# Patient Record
Sex: FEMALE | Race: WHITE | NOT HISPANIC OR LATINO | Employment: FULL TIME | URBAN - METROPOLITAN AREA
[De-identification: names, ages, dates, MRNs, and addresses within clinical notes are randomized per-mention and may not be internally consistent; named-entity substitution may affect disease eponyms.]

---

## 2018-04-30 ENCOUNTER — HOSPITAL ENCOUNTER (EMERGENCY)
Facility: HOSPITAL | Age: 38
Discharge: HOME | End: 2018-05-01
Attending: EMERGENCY MEDICINE | Admitting: EMERGENCY MEDICINE

## 2018-04-30 VITALS
RESPIRATION RATE: 16 BRPM | TEMPERATURE: 98 F | HEART RATE: 83 BPM | WEIGHT: 215 LBS | SYSTOLIC BLOOD PRESSURE: 139 MMHG | BODY MASS INDEX: 36.7 KG/M2 | DIASTOLIC BLOOD PRESSURE: 90 MMHG | OXYGEN SATURATION: 100 % | HEIGHT: 64 IN

## 2018-04-30 DIAGNOSIS — H43.12 VITREOUS HEMORRHAGE OF LEFT EYE (HCC): ICD-10-CM

## 2018-04-30 DIAGNOSIS — H54.62 VISION LOSS OF LEFT EYE: Primary | ICD-10-CM

## 2018-04-30 PROCEDURE — 99282 EMERGENCY DEPT VISIT SF MDM: CPT | Performed by: EMERGENCY MEDICINE

## 2018-04-30 PROCEDURE — 99283 EMERGENCY DEPT VISIT LOW MDM: CPT

## 2018-04-30 RX ORDER — OMEPRAZOLE 20 MG/1
20 CAPSULE, DELAYED RELEASE ORAL DAILY
COMMUNITY

## 2018-04-30 RX ORDER — VILAZODONE HYDROCHLORIDE 20 MG/1
20 TABLET ORAL DAILY
COMMUNITY
End: 2020-03-09

## 2018-04-30 RX ORDER — PROPARACAINE HYDROCHLORIDE 5 MG/ML
2 SOLUTION/ DROPS OPHTHALMIC ONCE
Status: COMPLETED | OUTPATIENT
Start: 2018-04-30 | End: 2018-04-30

## 2018-04-30 RX ORDER — GLIPIZIDE 2.5 MG/1
2.5 TABLET, EXTENDED RELEASE ORAL DAILY
Status: ON HOLD | COMMUNITY
End: 2021-08-28

## 2018-04-30 RX ORDER — PRAVASTATIN SODIUM 40 MG
40 TABLET ORAL DAILY
COMMUNITY

## 2018-04-30 RX ADMIN — PROPARACAINE HYDROCHLORIDE 2 DROP: 5 SOLUTION/ DROPS OPHTHALMIC at 23:34

## 2018-05-01 NOTE — ED NOTES
Right pupil reactive to light JABIER 3,  Left 3 slightly reactive     Jacque Aguilar RN  04/30/18 [id number]

## 2018-05-01 NOTE — ED PROVIDER NOTES
Subjective   History of Present Illness  History of Present Illness    Chief complaint: Blurred vision    Location: Left eye    Quality/Severity: Patient nearly blind in the left eye    Timing/Onset/Duration: Acute onset at 5:30 PM    Modifying Factors: Nothing makes it better or worse    Associated Symptoms: No headache.  No trauma.    Narrative: 37-year-old white female developed loss of vision her left eye at approximately 5:30.  Patient is diabetic.  She has had a bleed in her eye before.  The patient denies any trauma.    PCP:  Tad      No orders to display     Labs Reviewed - No data to display  No results found.    Final diagnoses:   None         ED Medications:  Medications - No data to display    New Medications:     Medication List      ASK your doctor about these medications    cyclobenzaprine 10 MG tablet  Commonly known as:  FLEXERIL  Take 1 tablet by mouth 3 (three) times a day as needed for muscle spasms.     glipiZIDE 2.5 MG 24 hr tablet  Commonly known as:  GLUCOTROL XL     insulin aspart 100 UNIT/ML injection  Commonly known as:  novoLOG     meloxicam 7.5 MG tablet  Commonly known as:  MOBIC  Take 1 tablet by mouth daily.     metFORMIN 1000 MG tablet  Commonly known as:  GLUCOPHAGE     omeprazole 20 MG capsule  Commonly known as:  priLOSEC     pravastatin 40 MG tablet  Commonly known as:  PRAVACHOL     vilazodone 20 MG tablet tablet  Commonly known as:  VIIBRYD          Stopped Medications:     Medication List      ASK your doctor about these medications    cyclobenzaprine 10 MG tablet  Commonly known as:  FLEXERIL  Take 1 tablet by mouth 3 (three) times a day as needed for muscle spasms.     glipiZIDE 2.5 MG 24 hr tablet  Commonly known as:  GLUCOTROL XL     insulin aspart 100 UNIT/ML injection  Commonly known as:  novoLOG     meloxicam 7.5 MG tablet  Commonly known as:  MOBIC  Take 1 tablet by mouth daily.     metFORMIN 1000 MG tablet  Commonly known as:  GLUCOPHAGE     omeprazole 20 MG  capsule  Commonly known as:  priLOSEC     pravastatin 40 MG tablet  Commonly known as:  PRAVACHOL     vilazodone 20 MG tablet tablet  Commonly known as:  VIIBRYD            Review of Systems   Eyes: Positive for visual disturbance. Negative for photophobia, pain, discharge, redness and itching.   Neurological: Negative for headaches.       Past Medical History:   Diagnosis Date   • Depression    • Diabetes    • GERD (gastroesophageal reflux disease)    • Hyperlipidemia    • ABBIE (obstructive sleep apnea)        Allergies   Allergen Reactions   • Januvia [Sitagliptin]    • Clindamycin/Lincomycin Rash       Past Surgical History:   Procedure Laterality Date   •  SECTION     • REFRACTIVE SURGERY  2018       Family History   Problem Relation Age of Onset   • Diabetes Mother    • Cancer Mother    • Diabetes Father    • Heart disease Father    • Hypertension Father    • Diabetes Maternal Grandfather    • Diabetes Paternal Grandmother        Social History     Social History   • Marital status:      Social History Main Topics   • Smoking status: Never Smoker   • Smokeless tobacco: Never Used   • Alcohol use No   • Drug use: No   • Sexual activity: Defer     Other Topics Concern   • Not on file           Objective   Physical Exam   Constitutional: She is oriented to person, place, and time. She appears well-developed and well-nourished. No distress.   ED Triage Vitals (18 2257)  Temp: 98 °F (36.7 °C)  Heart Rate: 83  Resp: 16  BP: 139/90  SpO2: 100 %  Temp src: Oral  Heart Rate Source: Monitor  Patient Position: Sitting  BP Location: Right arm  FiO2 (%): n/a    The patient's vitals were reviewed by me.  Unless otherwise noted they are within normal limits.     HENT:   Head: Normocephalic and atraumatic.   Eyes: Conjunctivae and EOM are normal. Pupils are equal, round, and reactive to light. Left eye exhibits no discharge. No scleral icterus.   The retina cannot be easily visualized and appears to be dark  in color.   Neurological: She is alert and oriented to person, place, and time.   Nursing note and vitals reviewed.      Procedures         ED Course  ED Course      11:56 PM, 04/30/18:  I spoke with Dr. Rogers, on-call for ophthalmology at [facility].  He would like the patient seen in the clinic tomorrow.  The patient should call [phone] at 9 AM to be seen tomorrow without fail.    11:57 PM, 04/30/18:  The ocular pressure in the left eye is 36.  The ocular pressure in the right eye is 25.  There is no fluorescein strips available for staining the cornea, 0 national backorder.          [facility]    Final diagnoses:   None            Joe Mejias MD  04/30/18 7212

## 2018-06-27 ENCOUNTER — OFFICE VISIT (OUTPATIENT)
Dept: OBSTETRICS AND GYNECOLOGY | Facility: CLINIC | Age: 38
End: 2018-06-27

## 2018-06-27 VITALS
BODY MASS INDEX: 37.56 KG/M2 | WEIGHT: 220 LBS | SYSTOLIC BLOOD PRESSURE: 122 MMHG | DIASTOLIC BLOOD PRESSURE: 80 MMHG | HEIGHT: 64 IN

## 2018-06-27 DIAGNOSIS — Z30.432 ENCOUNTER FOR REMOVAL OF INTRAUTERINE CONTRACEPTIVE DEVICE (IUD): ICD-10-CM

## 2018-06-27 DIAGNOSIS — Z30.018 ENCOUNTER FOR INITIAL PRESCRIPTION OF OTHER CONTRACEPTIVES: ICD-10-CM

## 2018-06-27 DIAGNOSIS — Z30.432 ENCOUNTER FOR IUD REMOVAL: Primary | ICD-10-CM

## 2018-06-27 LAB
B-HCG UR QL: NEGATIVE
BILIRUB BLD-MCNC: NEGATIVE MG/DL
CLARITY, POC: CLEAR
COLOR UR: YELLOW
GLUCOSE UR STRIP-MCNC: NEGATIVE MG/DL
INTERNAL NEGATIVE CONTROL: NEGATIVE
INTERNAL POSITIVE CONTROL: POSITIVE
KETONES UR QL: NEGATIVE
LEUKOCYTE EST, POC: NEGATIVE
Lab: NORMAL
NITRITE UR-MCNC: NEGATIVE MG/ML
PH UR: 6 [PH] (ref 5–8)
PROT UR STRIP-MCNC: NEGATIVE MG/DL
RBC # UR STRIP: NEGATIVE /UL
SP GR UR: 1.02 (ref 1–1.03)
UROBILINOGEN UR QL: NORMAL

## 2018-06-27 PROCEDURE — 58301 REMOVE INTRAUTERINE DEVICE: CPT | Performed by: NURSE PRACTITIONER

## 2018-06-27 PROCEDURE — 81025 URINE PREGNANCY TEST: CPT | Performed by: NURSE PRACTITIONER

## 2018-06-27 PROCEDURE — 81002 URINALYSIS NONAUTO W/O SCOPE: CPT | Performed by: NURSE PRACTITIONER

## 2018-06-27 PROCEDURE — 99203 OFFICE O/P NEW LOW 30 MIN: CPT | Performed by: NURSE PRACTITIONER

## 2018-06-27 RX ORDER — MEDROXYPROGESTERONE ACETATE 150 MG/ML
150 INJECTION, SUSPENSION INTRAMUSCULAR
Qty: 1 ML | Refills: 3 | Status: SHIPPED | OUTPATIENT
Start: 2018-06-27 | End: 2019-12-12

## 2018-07-05 LAB
A VAGINAE DNA VAG QL NAA+PROBE: ABNORMAL SCORE
BVAB2 DNA VAG QL NAA+PROBE: ABNORMAL SCORE
C ALBICANS DNA VAG QL NAA+PROBE: POSITIVE
C GLABRATA DNA VAG QL NAA+PROBE: NEGATIVE
C TRACH RRNA SPEC QL NAA+PROBE: NEGATIVE
LABORATORY COMMENT REPORT: ABNORMAL
M GENITALIUM DNA SPEC QL NAA+PROBE: NEGATIVE
M HOMINIS DNA SPEC QL NAA+PROBE: NEGATIVE
MEGA1 DNA VAG QL NAA+PROBE: ABNORMAL SCORE
N GONORRHOEA RRNA SPEC QL NAA+PROBE: NEGATIVE
T VAGINALIS RRNA SPEC QL NAA+PROBE: NEGATIVE
UREAPLASMA DNA SPEC QL NAA+PROBE: POSITIVE

## 2018-07-10 RX ORDER — FLUCONAZOLE 150 MG/1
150 TABLET ORAL ONCE
Qty: 1 TABLET | Refills: 1 | Status: SHIPPED | OUTPATIENT
Start: 2018-07-10 | End: 2018-07-10

## 2018-07-10 RX ORDER — DOXYCYCLINE HYCLATE 100 MG
100 TABLET ORAL 2 TIMES DAILY
Qty: 14 TABLET | Refills: 0 | Status: SHIPPED | OUTPATIENT
Start: 2018-07-10 | End: 2018-07-17

## 2021-08-28 ENCOUNTER — APPOINTMENT (OUTPATIENT)
Dept: GENERAL RADIOLOGY | Facility: HOSPITAL | Age: 41
End: 2021-08-28

## 2021-08-28 ENCOUNTER — HOSPITAL ENCOUNTER (INPATIENT)
Facility: HOSPITAL | Age: 41
LOS: 6 days | Discharge: HOME | End: 2021-09-03
Attending: EMERGENCY MEDICINE | Admitting: INTERNAL MEDICINE

## 2021-08-28 DIAGNOSIS — E11.628 DIABETIC FOOT INFECTION (HCC): Primary | ICD-10-CM

## 2021-08-28 DIAGNOSIS — L08.9 DIABETIC FOOT INFECTION (HCC): Primary | ICD-10-CM

## 2021-08-28 DIAGNOSIS — L03.115 CELLULITIS OF RIGHT FOOT: ICD-10-CM

## 2021-08-28 DIAGNOSIS — E08.65 DIABETES MELLITUS DUE TO UNDERLYING CONDITION, UNCONTROLLED, WITH HYPERGLYCEMIA (HCC): ICD-10-CM

## 2021-08-28 LAB
ALBUMIN SERPL-MCNC: 3.2 G/DL (ref 3.5–5.2)
ALBUMIN/GLOB SERPL: 0.9 G/DL
ALP SERPL-CCNC: 122 U/L (ref 39–117)
ALT SERPL W P-5'-P-CCNC: 11 U/L (ref 1–33)
ANION GAP SERPL CALCULATED.3IONS-SCNC: 10.9 MMOL/L (ref 5–15)
ANION GAP SERPL CALCULATED.3IONS-SCNC: 12.2 MMOL/L (ref 5–15)
APTT PPP: 33.1 SECONDS (ref 24.3–38.1)
AST SERPL-CCNC: 13 U/L (ref 1–32)
BASOPHILS # BLD AUTO: 0.09 10*3/MM3 (ref 0–0.2)
BASOPHILS NFR BLD AUTO: 0.5 % (ref 0–1.5)
BILIRUB SERPL-MCNC: 0.3 MG/DL (ref 0–1.2)
BUN SERPL-MCNC: 17 MG/DL (ref 6–20)
BUN SERPL-MCNC: 18 MG/DL (ref 6–20)
BUN/CREAT SERPL: 18.7 (ref 7–25)
BUN/CREAT SERPL: 19.1 (ref 7–25)
CALCIUM SPEC-SCNC: 8.2 MG/DL (ref 8.6–10.5)
CALCIUM SPEC-SCNC: 9.2 MG/DL (ref 8.6–10.5)
CHLORIDE SERPL-SCNC: 100 MMOL/L (ref 98–107)
CHLORIDE SERPL-SCNC: 94 MMOL/L (ref 98–107)
CO2 SERPL-SCNC: 22.1 MMOL/L (ref 22–29)
CO2 SERPL-SCNC: 23.8 MMOL/L (ref 22–29)
CREAT SERPL-MCNC: 0.91 MG/DL (ref 0.57–1)
CREAT SERPL-MCNC: 0.94 MG/DL (ref 0.57–1)
D-LACTATE SERPL-SCNC: 1.1 MMOL/L (ref 0.5–2)
DEPRECATED RDW RBC AUTO: 42.5 FL (ref 37–54)
DEPRECATED RDW RBC AUTO: 43.8 FL (ref 37–54)
EOSINOPHIL # BLD AUTO: 0.2 10*3/MM3 (ref 0–0.4)
EOSINOPHIL NFR BLD AUTO: 1.2 % (ref 0.3–6.2)
ERYTHROCYTE [DISTWIDTH] IN BLOOD BY AUTOMATED COUNT: 13.5 % (ref 12.3–15.4)
ERYTHROCYTE [DISTWIDTH] IN BLOOD BY AUTOMATED COUNT: 13.5 % (ref 12.3–15.4)
ERYTHROCYTE [SEDIMENTATION RATE] IN BLOOD: 126 MM/HR (ref 0–20)
GFR SERPL CREATININE-BSD FRML MDRD: 66 ML/MIN/1.73
GFR SERPL CREATININE-BSD FRML MDRD: 68 ML/MIN/1.73
GLOBULIN UR ELPH-MCNC: 3.5 GM/DL
GLUCOSE BLDC GLUCOMTR-MCNC: 382 MG/DL (ref 70–130)
GLUCOSE SERPL-MCNC: 386 MG/DL (ref 65–99)
GLUCOSE SERPL-MCNC: 442 MG/DL (ref 65–99)
HBA1C MFR BLD: 12.1 % (ref 4.8–5.6)
HCT VFR BLD AUTO: 34.9 % (ref 34–46.6)
HCT VFR BLD AUTO: 36.1 % (ref 34–46.6)
HGB BLD-MCNC: 11.9 G/DL (ref 12–15.9)
HGB BLD-MCNC: 12 G/DL (ref 12–15.9)
IMM GRANULOCYTES # BLD AUTO: 0.08 10*3/MM3 (ref 0–0.05)
IMM GRANULOCYTES NFR BLD AUTO: 0.5 % (ref 0–0.5)
INR PPP: 0.96 (ref 0.9–1.1)
LYMPHOCYTES # BLD AUTO: 2.7 10*3/MM3 (ref 0.7–3.1)
LYMPHOCYTES NFR BLD AUTO: 15.7 % (ref 19.6–45.3)
MCH RBC QN AUTO: 29.3 PG (ref 26.6–33)
MCH RBC QN AUTO: 29.5 PG (ref 26.6–33)
MCHC RBC AUTO-ENTMCNC: 33.2 G/DL (ref 31.5–35.7)
MCHC RBC AUTO-ENTMCNC: 34.1 G/DL (ref 31.5–35.7)
MCV RBC AUTO: 86.6 FL (ref 79–97)
MCV RBC AUTO: 88.3 FL (ref 79–97)
MONOCYTES # BLD AUTO: 1.16 10*3/MM3 (ref 0.1–0.9)
MONOCYTES NFR BLD AUTO: 6.7 % (ref 5–12)
NEUTROPHILS NFR BLD AUTO: 13 10*3/MM3 (ref 1.7–7)
NEUTROPHILS NFR BLD AUTO: 75.4 % (ref 42.7–76)
NRBC BLD AUTO-RTO: 0 /100 WBC (ref 0–0.2)
PLATELET # BLD AUTO: 316 10*3/MM3 (ref 140–450)
PLATELET # BLD AUTO: 360 10*3/MM3 (ref 140–450)
PMV BLD AUTO: 10.6 FL (ref 6–12)
PMV BLD AUTO: 11.1 FL (ref 6–12)
POTASSIUM SERPL-SCNC: 4.4 MMOL/L (ref 3.5–5.2)
POTASSIUM SERPL-SCNC: 4.4 MMOL/L (ref 3.5–5.2)
PROCALCITONIN SERPL-MCNC: 0.46 NG/ML (ref 0–0.25)
PROT SERPL-MCNC: 6.7 G/DL (ref 6–8.5)
PROTHROMBIN TIME: 12.8 SECONDS (ref 12.1–15)
RBC # BLD AUTO: 4.03 10*6/MM3 (ref 3.77–5.28)
RBC # BLD AUTO: 4.09 10*6/MM3 (ref 3.77–5.28)
SARS-COV-2 RNA PNL SPEC NAA+PROBE: NOT DETECTED
SODIUM SERPL-SCNC: 130 MMOL/L (ref 136–145)
SODIUM SERPL-SCNC: 133 MMOL/L (ref 136–145)
WBC # BLD AUTO: 13.85 10*3/MM3 (ref 3.4–10.8)
WBC # BLD AUTO: 17.23 10*3/MM3 (ref 3.4–10.8)

## 2021-08-28 PROCEDURE — 82962 GLUCOSE BLOOD TEST: CPT

## 2021-08-28 PROCEDURE — 80053 COMPREHEN METABOLIC PANEL: CPT | Performed by: EMERGENCY MEDICINE

## 2021-08-28 PROCEDURE — G0378 HOSPITAL OBSERVATION PER HR: HCPCS

## 2021-08-28 PROCEDURE — 25010000002 ENOXAPARIN PER 10 MG: Performed by: STUDENT IN AN ORGANIZED HEALTH CARE EDUCATION/TRAINING PROGRAM

## 2021-08-28 PROCEDURE — 83036 HEMOGLOBIN GLYCOSYLATED A1C: CPT | Performed by: FAMILY MEDICINE

## 2021-08-28 PROCEDURE — 25010000002 ENOXAPARIN PER 10 MG: Performed by: FAMILY MEDICINE

## 2021-08-28 PROCEDURE — 63710000001 INSULIN REGULAR HUMAN PER 5 UNITS: Performed by: FAMILY MEDICINE

## 2021-08-28 PROCEDURE — 85610 PROTHROMBIN TIME: CPT | Performed by: EMERGENCY MEDICINE

## 2021-08-28 PROCEDURE — 85652 RBC SED RATE AUTOMATED: CPT | Performed by: FAMILY MEDICINE

## 2021-08-28 PROCEDURE — 73630 X-RAY EXAM OF FOOT: CPT

## 2021-08-28 PROCEDURE — 87186 SC STD MICRODIL/AGAR DIL: CPT | Performed by: EMERGENCY MEDICINE

## 2021-08-28 PROCEDURE — 87205 SMEAR GRAM STAIN: CPT | Performed by: EMERGENCY MEDICINE

## 2021-08-28 PROCEDURE — 99284 EMERGENCY DEPT VISIT MOD MDM: CPT

## 2021-08-28 PROCEDURE — 87635 SARS-COV-2 COVID-19 AMP PRB: CPT | Performed by: EMERGENCY MEDICINE

## 2021-08-28 PROCEDURE — 85025 COMPLETE CBC W/AUTO DIFF WBC: CPT | Performed by: EMERGENCY MEDICINE

## 2021-08-28 PROCEDURE — 99219 PR INITIAL OBSERVATION CARE/DAY 50 MINUTES: CPT | Performed by: FAMILY MEDICINE

## 2021-08-28 PROCEDURE — 87147 CULTURE TYPE IMMUNOLOGIC: CPT | Performed by: EMERGENCY MEDICINE

## 2021-08-28 PROCEDURE — 94799 UNLISTED PULMONARY SVC/PX: CPT

## 2021-08-28 PROCEDURE — 99285 EMERGENCY DEPT VISIT HI MDM: CPT | Performed by: EMERGENCY MEDICINE

## 2021-08-28 PROCEDURE — 85730 THROMBOPLASTIN TIME PARTIAL: CPT | Performed by: EMERGENCY MEDICINE

## 2021-08-28 PROCEDURE — 80048 BASIC METABOLIC PNL TOTAL CA: CPT | Performed by: FAMILY MEDICINE

## 2021-08-28 PROCEDURE — 83605 ASSAY OF LACTIC ACID: CPT | Performed by: EMERGENCY MEDICINE

## 2021-08-28 PROCEDURE — 63710000001 INSULIN DETEMIR PER 5 UNITS: Performed by: FAMILY MEDICINE

## 2021-08-28 PROCEDURE — 84145 PROCALCITONIN (PCT): CPT | Performed by: EMERGENCY MEDICINE

## 2021-08-28 PROCEDURE — 87070 CULTURE OTHR SPECIMN AEROBIC: CPT | Performed by: EMERGENCY MEDICINE

## 2021-08-28 PROCEDURE — 25010000002 VANCOMYCIN 1 G RECONSTITUTED SOLUTION 1 EACH VIAL: Performed by: EMERGENCY MEDICINE

## 2021-08-28 PROCEDURE — 25010000002 PIPERACILLIN SOD-TAZOBACTAM PER 1 G: Performed by: EMERGENCY MEDICINE

## 2021-08-28 PROCEDURE — 25010000002 VANCOMYCIN 1 G RECONSTITUTED SOLUTION

## 2021-08-28 PROCEDURE — 87040 BLOOD CULTURE FOR BACTERIA: CPT | Performed by: EMERGENCY MEDICINE

## 2021-08-28 PROCEDURE — 85027 COMPLETE CBC AUTOMATED: CPT | Performed by: FAMILY MEDICINE

## 2021-08-28 RX ORDER — SODIUM CHLORIDE 9 MG/ML
40 INJECTION, SOLUTION INTRAVENOUS AS NEEDED
Status: DISCONTINUED | OUTPATIENT
Start: 2021-08-28 | End: 2021-09-03 | Stop reason: HOSPADM

## 2021-08-28 RX ORDER — PANTOPRAZOLE SODIUM 40 MG/1
40 TABLET, DELAYED RELEASE ORAL EVERY MORNING
Status: DISCONTINUED | OUTPATIENT
Start: 2021-08-29 | End: 2021-09-03 | Stop reason: HOSPADM

## 2021-08-28 RX ORDER — SODIUM CHLORIDE 0.9 % (FLUSH) 0.9 %
10 SYRINGE (ML) INJECTION EVERY 12 HOURS SCHEDULED
Status: DISCONTINUED | OUTPATIENT
Start: 2021-08-28 | End: 2021-09-03 | Stop reason: HOSPADM

## 2021-08-28 RX ORDER — ONDANSETRON 4 MG/1
4 TABLET, FILM COATED ORAL EVERY 6 HOURS PRN
Status: DISCONTINUED | OUTPATIENT
Start: 2021-08-28 | End: 2021-09-03 | Stop reason: HOSPADM

## 2021-08-28 RX ORDER — DEXTROSE MONOHYDRATE 25 G/50ML
25 INJECTION, SOLUTION INTRAVENOUS
Status: DISCONTINUED | OUTPATIENT
Start: 2021-08-28 | End: 2021-09-03 | Stop reason: HOSPADM

## 2021-08-28 RX ORDER — ACETAMINOPHEN 650 MG/1
650 SUPPOSITORY RECTAL EVERY 4 HOURS PRN
Status: DISCONTINUED | OUTPATIENT
Start: 2021-08-28 | End: 2021-09-03 | Stop reason: HOSPADM

## 2021-08-28 RX ORDER — LISINOPRIL 10 MG/1
10 TABLET ORAL
Status: DISCONTINUED | OUTPATIENT
Start: 2021-08-29 | End: 2021-09-03 | Stop reason: HOSPADM

## 2021-08-28 RX ORDER — VANCOMYCIN HYDROCHLORIDE 1 G/20ML
INJECTION, POWDER, LYOPHILIZED, FOR SOLUTION INTRAVENOUS
Status: COMPLETED
Start: 2021-08-28 | End: 2021-08-28

## 2021-08-28 RX ORDER — ACETAMINOPHEN 160 MG/5ML
650 SOLUTION ORAL EVERY 4 HOURS PRN
Status: DISCONTINUED | OUTPATIENT
Start: 2021-08-28 | End: 2021-09-03 | Stop reason: HOSPADM

## 2021-08-28 RX ORDER — NICOTINE POLACRILEX 4 MG
15 LOZENGE BUCCAL
Status: DISCONTINUED | OUTPATIENT
Start: 2021-08-28 | End: 2021-09-03 | Stop reason: HOSPADM

## 2021-08-28 RX ORDER — SODIUM CHLORIDE 9 MG/ML
INJECTION, SOLUTION INTRAVENOUS
Status: COMPLETED
Start: 2021-08-28 | End: 2021-08-28

## 2021-08-28 RX ORDER — ACETAMINOPHEN 325 MG/1
650 TABLET ORAL EVERY 4 HOURS PRN
Status: DISCONTINUED | OUTPATIENT
Start: 2021-08-28 | End: 2021-09-03 | Stop reason: HOSPADM

## 2021-08-28 RX ORDER — SODIUM CHLORIDE 0.9 % (FLUSH) 0.9 %
1-10 SYRINGE (ML) INJECTION AS NEEDED
Status: DISCONTINUED | OUTPATIENT
Start: 2021-08-28 | End: 2021-09-03 | Stop reason: HOSPADM

## 2021-08-28 RX ORDER — PRAVASTATIN SODIUM 20 MG
40 TABLET ORAL DAILY
Status: DISCONTINUED | OUTPATIENT
Start: 2021-08-29 | End: 2021-09-03 | Stop reason: HOSPADM

## 2021-08-28 RX ORDER — GLIPIZIDE 10 MG/1
10 TABLET ORAL
Status: DISCONTINUED | OUTPATIENT
Start: 2021-08-29 | End: 2021-08-29

## 2021-08-28 RX ORDER — SODIUM CHLORIDE 0.9 % (FLUSH) 0.9 %
10 SYRINGE (ML) INJECTION AS NEEDED
Status: DISCONTINUED | OUTPATIENT
Start: 2021-08-28 | End: 2021-09-03 | Stop reason: HOSPADM

## 2021-08-28 RX ORDER — ONDANSETRON 2 MG/ML
4 INJECTION INTRAMUSCULAR; INTRAVENOUS EVERY 6 HOURS PRN
Status: DISCONTINUED | OUTPATIENT
Start: 2021-08-28 | End: 2021-09-03 | Stop reason: HOSPADM

## 2021-08-28 RX ADMIN — VANCOMYCIN HYDROCHLORIDE 1 G: 1 INJECTION, POWDER, LYOPHILIZED, FOR SOLUTION INTRAVENOUS at 20:21

## 2021-08-28 RX ADMIN — HUMAN INSULIN 10 UNITS: 100 INJECTION, SOLUTION SUBCUTANEOUS at 23:52

## 2021-08-28 RX ADMIN — ENOXAPARIN SODIUM 40 MG: 40 INJECTION SUBCUTANEOUS at 23:54

## 2021-08-28 RX ADMIN — INSULIN DETEMIR 30 UNITS: 100 INJECTION, SOLUTION SUBCUTANEOUS at 23:54

## 2021-08-28 RX ADMIN — METFORMIN HYDROCHLORIDE 1000 MG: 500 TABLET ORAL at 23:54

## 2021-08-28 RX ADMIN — SODIUM CHLORIDE, PRESERVATIVE FREE 10 ML: 5 INJECTION INTRAVENOUS at 23:40

## 2021-08-28 RX ADMIN — SODIUM CHLORIDE: 900 INJECTION, SOLUTION INTRAVENOUS at 20:22

## 2021-08-28 RX ADMIN — VANCOMYCIN HYDROCHLORIDE: 1 INJECTION, POWDER, LYOPHILIZED, FOR SOLUTION INTRAVENOUS at 20:22

## 2021-08-29 LAB
CRP SERPL-MCNC: 7.97 MG/DL (ref 0–0.5)
GLUCOSE BLDC GLUCOMTR-MCNC: 125 MG/DL (ref 70–130)
GLUCOSE BLDC GLUCOMTR-MCNC: 177 MG/DL (ref 70–130)
GLUCOSE BLDC GLUCOMTR-MCNC: 363 MG/DL (ref 70–130)
GLUCOSE BLDC GLUCOMTR-MCNC: 375 MG/DL (ref 70–130)
GLUCOSE BLDC GLUCOMTR-MCNC: 65 MG/DL (ref 70–130)
GLUCOSE BLDC GLUCOMTR-MCNC: 86 MG/DL (ref 70–130)

## 2021-08-29 PROCEDURE — G0378 HOSPITAL OBSERVATION PER HR: HCPCS

## 2021-08-29 PROCEDURE — 63710000001 INSULIN DETEMIR PER 5 UNITS: Performed by: STUDENT IN AN ORGANIZED HEALTH CARE EDUCATION/TRAINING PROGRAM

## 2021-08-29 PROCEDURE — 82962 GLUCOSE BLOOD TEST: CPT

## 2021-08-29 PROCEDURE — 93010 ELECTROCARDIOGRAM REPORT: CPT | Performed by: INTERNAL MEDICINE

## 2021-08-29 PROCEDURE — 25010000002 PIPERACILLIN SOD-TAZOBACTAM PER 1 G

## 2021-08-29 PROCEDURE — 63710000001 INSULIN DETEMIR PER 5 UNITS: Performed by: INTERNAL MEDICINE

## 2021-08-29 PROCEDURE — 25010000002 PIPERACILLIN SOD-TAZOBACTAM PER 1 G: Performed by: FAMILY MEDICINE

## 2021-08-29 PROCEDURE — 93005 ELECTROCARDIOGRAM TRACING: CPT | Performed by: INTERNAL MEDICINE

## 2021-08-29 PROCEDURE — 94799 UNLISTED PULMONARY SVC/PX: CPT

## 2021-08-29 PROCEDURE — 63710000001 INSULIN ASPART PER 5 UNITS: Performed by: FAMILY MEDICINE

## 2021-08-29 PROCEDURE — 25010000002 PIPERACILLIN SOD-TAZOBACTAM PER 1 G: Performed by: EMERGENCY MEDICINE

## 2021-08-29 PROCEDURE — 25010000002 ENOXAPARIN PER 10 MG: Performed by: STUDENT IN AN ORGANIZED HEALTH CARE EDUCATION/TRAINING PROGRAM

## 2021-08-29 PROCEDURE — 25010000002 VANCOMYCIN PER 500 MG: Performed by: FAMILY MEDICINE

## 2021-08-29 PROCEDURE — 99226 PR SBSQ OBSERVATION CARE/DAY 35 MINUTES: CPT | Performed by: INTERNAL MEDICINE

## 2021-08-29 PROCEDURE — 86140 C-REACTIVE PROTEIN: CPT | Performed by: FAMILY MEDICINE

## 2021-08-29 PROCEDURE — 25010000002 ENOXAPARIN PER 10 MG: Performed by: FAMILY MEDICINE

## 2021-08-29 PROCEDURE — 25010000002 VANCOMYCIN 750 MG RECONSTITUTED SOLUTION 1 EACH VIAL: Performed by: FAMILY MEDICINE

## 2021-08-29 RX ORDER — SODIUM CHLORIDE 9 MG/ML
INJECTION, SOLUTION INTRAVENOUS
Status: COMPLETED
Start: 2021-08-29 | End: 2021-08-29

## 2021-08-29 RX ORDER — GLIPIZIDE 5 MG/1
5 TABLET ORAL
Status: DISCONTINUED | OUTPATIENT
Start: 2021-08-29 | End: 2021-09-02

## 2021-08-29 RX ORDER — PIPERACILLIN SODIUM, TAZOBACTAM SODIUM 3; .375 G/15ML; G/15ML
INJECTION, POWDER, LYOPHILIZED, FOR SOLUTION INTRAVENOUS
Status: DISPENSED
Start: 2021-08-29 | End: 2021-08-29

## 2021-08-29 RX ADMIN — VANCOMYCIN HYDROCHLORIDE 1250 MG: 500 INJECTION, POWDER, LYOPHILIZED, FOR SOLUTION INTRAVENOUS at 08:29

## 2021-08-29 RX ADMIN — METFORMIN HYDROCHLORIDE 1000 MG: 500 TABLET ORAL at 17:34

## 2021-08-29 RX ADMIN — ENOXAPARIN SODIUM 40 MG: 40 INJECTION SUBCUTANEOUS at 21:46

## 2021-08-29 RX ADMIN — METFORMIN HYDROCHLORIDE 1000 MG: 500 TABLET ORAL at 08:07

## 2021-08-29 RX ADMIN — ACETAMINOPHEN 650 MG: 325 TABLET, FILM COATED ORAL at 17:33

## 2021-08-29 RX ADMIN — SODIUM CHLORIDE, PRESERVATIVE FREE 10 ML: 5 INJECTION INTRAVENOUS at 20:38

## 2021-08-29 RX ADMIN — INSULIN ASPART 20 UNITS: 100 INJECTION, SOLUTION INTRAVENOUS; SUBCUTANEOUS at 08:29

## 2021-08-29 RX ADMIN — GLIPIZIDE 5 MG: 5 TABLET ORAL at 17:38

## 2021-08-29 RX ADMIN — PIPERACILLIN AND TAZOBACTAM 3.38 G: 3; .375 INJECTION, POWDER, FOR SOLUTION INTRAVENOUS at 04:54

## 2021-08-29 RX ADMIN — SODIUM CHLORIDE: 900 INJECTION INTRAVENOUS at 04:55

## 2021-08-29 RX ADMIN — INSULIN DETEMIR 20 UNITS: 100 INJECTION, SOLUTION SUBCUTANEOUS at 20:28

## 2021-08-29 RX ADMIN — PIPERACILLIN AND TAZOBACTAM 3.38 G: 3; .375 INJECTION, POWDER, FOR SOLUTION INTRAVENOUS at 09:58

## 2021-08-29 RX ADMIN — PRAVASTATIN SODIUM 40 MG: 20 TABLET ORAL at 08:05

## 2021-08-29 RX ADMIN — GLIPIZIDE 10 MG: 10 TABLET ORAL at 07:01

## 2021-08-29 RX ADMIN — ACETAMINOPHEN 650 MG: 325 TABLET, FILM COATED ORAL at 10:21

## 2021-08-29 RX ADMIN — LISINOPRIL 10 MG: 10 TABLET ORAL at 08:05

## 2021-08-29 RX ADMIN — VANCOMYCIN HYDROCHLORIDE 1250 MG: 500 INJECTION, POWDER, LYOPHILIZED, FOR SOLUTION INTRAVENOUS at 20:27

## 2021-08-29 RX ADMIN — ACETAMINOPHEN 650 MG: 325 TABLET, FILM COATED ORAL at 21:46

## 2021-08-29 RX ADMIN — PIPERACILLIN AND TAZOBACTAM 3.38 G: 3; .375 INJECTION, POWDER, FOR SOLUTION INTRAVENOUS at 17:34

## 2021-08-29 RX ADMIN — PANTOPRAZOLE SODIUM 40 MG: 40 TABLET, DELAYED RELEASE ORAL at 07:01

## 2021-08-29 RX ADMIN — ACETAMINOPHEN 650 MG: 325 TABLET, FILM COATED ORAL at 00:07

## 2021-08-29 RX ADMIN — SODIUM CHLORIDE, PRESERVATIVE FREE 10 ML: 5 INJECTION INTRAVENOUS at 08:07

## 2021-08-30 ENCOUNTER — PREP FOR SURGERY (OUTPATIENT)
Dept: OTHER | Facility: HOSPITAL | Age: 41
End: 2021-08-30

## 2021-08-30 ENCOUNTER — APPOINTMENT (OUTPATIENT)
Dept: ULTRASOUND IMAGING | Facility: HOSPITAL | Age: 41
End: 2021-08-30

## 2021-08-30 ENCOUNTER — APPOINTMENT (OUTPATIENT)
Dept: MRI IMAGING | Facility: HOSPITAL | Age: 41
End: 2021-08-30

## 2021-08-30 DIAGNOSIS — L03.115 CELLULITIS OF RIGHT FOOT: Primary | ICD-10-CM

## 2021-08-30 PROBLEM — E83.42 HYPOMAGNESEMIA: Status: ACTIVE | Noted: 2021-08-30

## 2021-08-30 LAB
ALBUMIN SERPL-MCNC: 2.4 G/DL (ref 3.5–5.2)
ANION GAP SERPL CALCULATED.3IONS-SCNC: 10.8 MMOL/L (ref 5–15)
ANION GAP SERPL CALCULATED.3IONS-SCNC: 10.9 MMOL/L (ref 5–15)
BASOPHILS # BLD AUTO: 0.08 10*3/MM3 (ref 0–0.2)
BASOPHILS NFR BLD AUTO: 0.5 % (ref 0–1.5)
BUN SERPL-MCNC: 24 MG/DL (ref 6–20)
BUN SERPL-MCNC: 24 MG/DL (ref 6–20)
BUN/CREAT SERPL: 17.4 (ref 7–25)
BUN/CREAT SERPL: 18.5 (ref 7–25)
CALCIUM SPEC-SCNC: 7.8 MG/DL (ref 8.6–10.5)
CALCIUM SPEC-SCNC: 8.2 MG/DL (ref 8.6–10.5)
CHLORIDE SERPL-SCNC: 100 MMOL/L (ref 98–107)
CHLORIDE SERPL-SCNC: 100 MMOL/L (ref 98–107)
CHOLEST SERPL-MCNC: 144 MG/DL (ref 0–200)
CO2 SERPL-SCNC: 20.2 MMOL/L (ref 22–29)
CO2 SERPL-SCNC: 22.1 MMOL/L (ref 22–29)
CREAT SERPL-MCNC: 1.3 MG/DL (ref 0.57–1)
CREAT SERPL-MCNC: 1.38 MG/DL (ref 0.57–1)
CRP SERPL-MCNC: 7.57 MG/DL (ref 0–0.5)
DEPRECATED RDW RBC AUTO: 46.8 FL (ref 37–54)
EOSINOPHIL # BLD AUTO: 0.24 10*3/MM3 (ref 0–0.4)
EOSINOPHIL NFR BLD AUTO: 1.5 % (ref 0.3–6.2)
ERYTHROCYTE [DISTWIDTH] IN BLOOD BY AUTOMATED COUNT: 13.8 % (ref 12.3–15.4)
GFR SERPL CREATININE-BSD FRML MDRD: 42 ML/MIN/1.73
GFR SERPL CREATININE-BSD FRML MDRD: 45 ML/MIN/1.73
GLUCOSE BLDC GLUCOMTR-MCNC: 132 MG/DL (ref 70–130)
GLUCOSE BLDC GLUCOMTR-MCNC: 177 MG/DL (ref 70–130)
GLUCOSE BLDC GLUCOMTR-MCNC: 205 MG/DL (ref 70–130)
GLUCOSE BLDC GLUCOMTR-MCNC: 221 MG/DL (ref 70–130)
GLUCOSE SERPL-MCNC: 129 MG/DL (ref 65–99)
GLUCOSE SERPL-MCNC: 191 MG/DL (ref 65–99)
HCT VFR BLD AUTO: 29.3 % (ref 34–46.6)
HDLC SERPL-MCNC: 29 MG/DL (ref 40–60)
HGB BLD-MCNC: 9.7 G/DL (ref 12–15.9)
IMM GRANULOCYTES # BLD AUTO: 0.09 10*3/MM3 (ref 0–0.05)
IMM GRANULOCYTES NFR BLD AUTO: 0.6 % (ref 0–0.5)
LDLC SERPL CALC-MCNC: 70 MG/DL (ref 0–100)
LDLC/HDLC SERPL: 2.03 {RATIO}
LYMPHOCYTES # BLD AUTO: 3.14 10*3/MM3 (ref 0.7–3.1)
LYMPHOCYTES NFR BLD AUTO: 19.2 % (ref 19.6–45.3)
MAGNESIUM SERPL-MCNC: 1.5 MG/DL (ref 1.6–2.6)
MCH RBC QN AUTO: 30.2 PG (ref 26.6–33)
MCHC RBC AUTO-ENTMCNC: 33.1 G/DL (ref 31.5–35.7)
MCV RBC AUTO: 91.3 FL (ref 79–97)
MONOCYTES # BLD AUTO: 1.45 10*3/MM3 (ref 0.1–0.9)
MONOCYTES NFR BLD AUTO: 8.9 % (ref 5–12)
NEUTROPHILS NFR BLD AUTO: 11.33 10*3/MM3 (ref 1.7–7)
NEUTROPHILS NFR BLD AUTO: 69.3 % (ref 42.7–76)
PHOSPHATE SERPL-MCNC: 4.3 MG/DL (ref 2.5–4.5)
PLATELET # BLD AUTO: 283 10*3/MM3 (ref 140–450)
PMV BLD AUTO: 10.7 FL (ref 6–12)
POTASSIUM SERPL-SCNC: 3.8 MMOL/L (ref 3.5–5.2)
POTASSIUM SERPL-SCNC: 4.2 MMOL/L (ref 3.5–5.2)
QT INTERVAL: 360 MS
RBC # BLD AUTO: 3.21 10*6/MM3 (ref 3.77–5.28)
SODIUM SERPL-SCNC: 131 MMOL/L (ref 136–145)
SODIUM SERPL-SCNC: 133 MMOL/L (ref 136–145)
TRIGL SERPL-MCNC: 280 MG/DL (ref 0–150)
VANCOMYCIN SERPL-MCNC: 22.5 MCG/ML (ref 5–40)
VLDLC SERPL-MCNC: 45 MG/DL (ref 5–40)
WBC # BLD AUTO: 16.33 10*3/MM3 (ref 3.4–10.8)

## 2021-08-30 PROCEDURE — 25010000002 VANCOMYCIN PER 500 MG: Performed by: FAMILY MEDICINE

## 2021-08-30 PROCEDURE — 25010000002 PIPERACILLIN SOD-TAZOBACTAM PER 1 G: Performed by: FAMILY MEDICINE

## 2021-08-30 PROCEDURE — 63710000001 INSULIN ASPART PER 5 UNITS: Performed by: INTERNAL MEDICINE

## 2021-08-30 PROCEDURE — 80061 LIPID PANEL: CPT | Performed by: INTERNAL MEDICINE

## 2021-08-30 PROCEDURE — 0 GADOBENATE DIMEGLUMINE 529 MG/ML SOLUTION: Performed by: INTERNAL MEDICINE

## 2021-08-30 PROCEDURE — 73720 MRI LWR EXTREMITY W/O&W/DYE: CPT

## 2021-08-30 PROCEDURE — 93971 EXTREMITY STUDY: CPT

## 2021-08-30 PROCEDURE — 25010000002 MAGNESIUM SULFATE 2 GM/50ML SOLUTION: Performed by: INTERNAL MEDICINE

## 2021-08-30 PROCEDURE — 25010000002 CEFEPIME-DEXTROSE 2-5 GM-%(50ML) RECONSTITUTED SOLUTION: Performed by: INTERNAL MEDICINE

## 2021-08-30 PROCEDURE — A9577 INJ MULTIHANCE: HCPCS | Performed by: INTERNAL MEDICINE

## 2021-08-30 PROCEDURE — 80048 BASIC METABOLIC PNL TOTAL CA: CPT | Performed by: INTERNAL MEDICINE

## 2021-08-30 PROCEDURE — 25010000002 ENOXAPARIN PER 10 MG: Performed by: FAMILY MEDICINE

## 2021-08-30 PROCEDURE — 85025 COMPLETE CBC W/AUTO DIFF WBC: CPT | Performed by: INTERNAL MEDICINE

## 2021-08-30 PROCEDURE — 83735 ASSAY OF MAGNESIUM: CPT | Performed by: INTERNAL MEDICINE

## 2021-08-30 PROCEDURE — 99233 SBSQ HOSP IP/OBS HIGH 50: CPT | Performed by: INTERNAL MEDICINE

## 2021-08-30 PROCEDURE — 94799 UNLISTED PULMONARY SVC/PX: CPT

## 2021-08-30 PROCEDURE — 82962 GLUCOSE BLOOD TEST: CPT

## 2021-08-30 PROCEDURE — 63710000001 INSULIN ASPART PER 5 UNITS: Performed by: STUDENT IN AN ORGANIZED HEALTH CARE EDUCATION/TRAINING PROGRAM

## 2021-08-30 PROCEDURE — 80202 ASSAY OF VANCOMYCIN: CPT | Performed by: FAMILY MEDICINE

## 2021-08-30 PROCEDURE — 86140 C-REACTIVE PROTEIN: CPT | Performed by: INTERNAL MEDICINE

## 2021-08-30 PROCEDURE — 63710000001 INSULIN DETEMIR PER 5 UNITS: Performed by: INTERNAL MEDICINE

## 2021-08-30 PROCEDURE — 80069 RENAL FUNCTION PANEL: CPT | Performed by: INTERNAL MEDICINE

## 2021-08-30 PROCEDURE — 25010000002 VANCOMYCIN 750 MG RECONSTITUTED SOLUTION 1 EACH VIAL: Performed by: FAMILY MEDICINE

## 2021-08-30 RX ORDER — CEFEPIME HYDROCHLORIDE 2 G/50ML
2 INJECTION, SOLUTION INTRAVENOUS EVERY 12 HOURS SCHEDULED
Status: DISCONTINUED | OUTPATIENT
Start: 2021-08-30 | End: 2021-09-01

## 2021-08-30 RX ORDER — MAGNESIUM SULFATE HEPTAHYDRATE 40 MG/ML
2 INJECTION, SOLUTION INTRAVENOUS ONCE
Status: COMPLETED | OUTPATIENT
Start: 2021-08-30 | End: 2021-08-30

## 2021-08-30 RX ORDER — SODIUM CHLORIDE 9 MG/ML
75 INJECTION, SOLUTION INTRAVENOUS CONTINUOUS
Status: DISCONTINUED | OUTPATIENT
Start: 2021-08-30 | End: 2021-08-31

## 2021-08-30 RX ADMIN — MAGNESIUM SULFATE HEPTAHYDRATE 2 G: 40 INJECTION, SOLUTION INTRAVENOUS at 13:17

## 2021-08-30 RX ADMIN — ENOXAPARIN SODIUM 40 MG: 40 INJECTION SUBCUTANEOUS at 20:08

## 2021-08-30 RX ADMIN — METFORMIN HYDROCHLORIDE 1000 MG: 500 TABLET ORAL at 08:23

## 2021-08-30 RX ADMIN — INSULIN ASPART 4 UNITS: 100 INJECTION, SOLUTION INTRAVENOUS; SUBCUTANEOUS at 13:16

## 2021-08-30 RX ADMIN — ACETAMINOPHEN 650 MG: 325 TABLET, FILM COATED ORAL at 20:08

## 2021-08-30 RX ADMIN — SODIUM CHLORIDE, PRESERVATIVE FREE 10 ML: 5 INJECTION INTRAVENOUS at 08:25

## 2021-08-30 RX ADMIN — METFORMIN HYDROCHLORIDE 1000 MG: 500 TABLET ORAL at 17:16

## 2021-08-30 RX ADMIN — SODIUM CHLORIDE 500 ML: 9 INJECTION, SOLUTION INTRAVENOUS at 11:17

## 2021-08-30 RX ADMIN — INSULIN DETEMIR 20 UNITS: 100 INJECTION, SOLUTION SUBCUTANEOUS at 20:09

## 2021-08-30 RX ADMIN — SODIUM CHLORIDE, PRESERVATIVE FREE 10 ML: 5 INJECTION INTRAVENOUS at 20:15

## 2021-08-30 RX ADMIN — CEFEPIME HYDROCHLORIDE 2 G: 2 INJECTION, SOLUTION INTRAVENOUS at 11:19

## 2021-08-30 RX ADMIN — ACETAMINOPHEN 650 MG: 325 TABLET, FILM COATED ORAL at 02:55

## 2021-08-30 RX ADMIN — GADOBENATE DIMEGLUMINE 18 ML: 529 INJECTION, SOLUTION INTRAVENOUS at 12:24

## 2021-08-30 RX ADMIN — ACETAMINOPHEN 650 MG: 325 TABLET, FILM COATED ORAL at 11:11

## 2021-08-30 RX ADMIN — PANTOPRAZOLE SODIUM 40 MG: 40 TABLET, DELAYED RELEASE ORAL at 08:24

## 2021-08-30 RX ADMIN — PRAVASTATIN SODIUM 40 MG: 20 TABLET ORAL at 08:23

## 2021-08-30 RX ADMIN — PIPERACILLIN AND TAZOBACTAM 3.38 G: 3; .375 INJECTION, POWDER, FOR SOLUTION INTRAVENOUS at 01:39

## 2021-08-30 RX ADMIN — GLIPIZIDE 5 MG: 5 TABLET ORAL at 17:16

## 2021-08-30 RX ADMIN — LISINOPRIL 10 MG: 10 TABLET ORAL at 08:25

## 2021-08-30 RX ADMIN — SODIUM CHLORIDE 75 ML/HR: 9 INJECTION, SOLUTION INTRAVENOUS at 23:00

## 2021-08-30 RX ADMIN — GLIPIZIDE 5 MG: 5 TABLET ORAL at 08:23

## 2021-08-30 RX ADMIN — CEFEPIME HYDROCHLORIDE 2 G: 2 INJECTION, SOLUTION INTRAVENOUS at 20:16

## 2021-08-30 RX ADMIN — INSULIN ASPART 2 UNITS: 100 INJECTION, SOLUTION INTRAVENOUS; SUBCUTANEOUS at 08:24

## 2021-08-30 RX ADMIN — VANCOMYCIN HYDROCHLORIDE 1250 MG: 500 INJECTION, POWDER, LYOPHILIZED, FOR SOLUTION INTRAVENOUS at 08:10

## 2021-08-31 ENCOUNTER — ANESTHESIA EVENT (OUTPATIENT)
Dept: PERIOP | Facility: HOSPITAL | Age: 41
End: 2021-08-31

## 2021-08-31 ENCOUNTER — ANESTHESIA (OUTPATIENT)
Dept: PERIOP | Facility: HOSPITAL | Age: 41
End: 2021-08-31

## 2021-08-31 ENCOUNTER — APPOINTMENT (OUTPATIENT)
Dept: GENERAL RADIOLOGY | Facility: HOSPITAL | Age: 41
End: 2021-08-31

## 2021-08-31 PROBLEM — L03.115 CELLULITIS OF RIGHT FOOT: Status: ACTIVE | Noted: 2021-08-28

## 2021-08-31 LAB
ANION GAP SERPL CALCULATED.3IONS-SCNC: 6.9 MMOL/L (ref 5–15)
BACTERIA SPEC AEROBE CULT: ABNORMAL
BASOPHILS # BLD AUTO: 0.06 10*3/MM3 (ref 0–0.2)
BASOPHILS NFR BLD AUTO: 0.5 % (ref 0–1.5)
BUN SERPL-MCNC: 22 MG/DL (ref 6–20)
BUN/CREAT SERPL: 17.6 (ref 7–25)
CALCIUM SPEC-SCNC: 8.4 MG/DL (ref 8.6–10.5)
CHLORIDE SERPL-SCNC: 105 MMOL/L (ref 98–107)
CO2 SERPL-SCNC: 23.1 MMOL/L (ref 22–29)
CREAT SERPL-MCNC: 1.25 MG/DL (ref 0.57–1)
DEPRECATED RDW RBC AUTO: 45.6 FL (ref 37–54)
EOSINOPHIL # BLD AUTO: 0.18 10*3/MM3 (ref 0–0.4)
EOSINOPHIL NFR BLD AUTO: 1.5 % (ref 0.3–6.2)
ERYTHROCYTE [DISTWIDTH] IN BLOOD BY AUTOMATED COUNT: 13.6 % (ref 12.3–15.4)
GFR SERPL CREATININE-BSD FRML MDRD: 47 ML/MIN/1.73
GLUCOSE BLDC GLUCOMTR-MCNC: 142 MG/DL (ref 70–130)
GLUCOSE BLDC GLUCOMTR-MCNC: 148 MG/DL (ref 70–130)
GLUCOSE BLDC GLUCOMTR-MCNC: 182 MG/DL (ref 70–130)
GLUCOSE BLDC GLUCOMTR-MCNC: 385 MG/DL (ref 70–130)
GLUCOSE SERPL-MCNC: 178 MG/DL (ref 65–99)
GRAM STN SPEC: ABNORMAL
HCT VFR BLD AUTO: 30.6 % (ref 34–46.6)
HGB BLD-MCNC: 10 G/DL (ref 12–15.9)
IMM GRANULOCYTES # BLD AUTO: 0.08 10*3/MM3 (ref 0–0.05)
IMM GRANULOCYTES NFR BLD AUTO: 0.6 % (ref 0–0.5)
LYMPHOCYTES # BLD AUTO: 2.17 10*3/MM3 (ref 0.7–3.1)
LYMPHOCYTES NFR BLD AUTO: 17.6 % (ref 19.6–45.3)
MCH RBC QN AUTO: 29.9 PG (ref 26.6–33)
MCHC RBC AUTO-ENTMCNC: 32.7 G/DL (ref 31.5–35.7)
MCV RBC AUTO: 91.3 FL (ref 79–97)
MONOCYTES # BLD AUTO: 1.29 10*3/MM3 (ref 0.1–0.9)
MONOCYTES NFR BLD AUTO: 10.5 % (ref 5–12)
NEUTROPHILS NFR BLD AUTO: 69.3 % (ref 42.7–76)
NEUTROPHILS NFR BLD AUTO: 8.54 10*3/MM3 (ref 1.7–7)
NRBC BLD AUTO-RTO: 0 /100 WBC (ref 0–0.2)
PLATELET # BLD AUTO: 287 10*3/MM3 (ref 140–450)
PMV BLD AUTO: 10.7 FL (ref 6–12)
POTASSIUM SERPL-SCNC: 4.2 MMOL/L (ref 3.5–5.2)
RBC # BLD AUTO: 3.35 10*6/MM3 (ref 3.77–5.28)
SODIUM SERPL-SCNC: 135 MMOL/L (ref 136–145)
WBC # BLD AUTO: 12.32 10*3/MM3 (ref 3.4–10.8)

## 2021-08-31 PROCEDURE — 25010000002 ONDANSETRON PER 1 MG: Performed by: REGISTERED NURSE

## 2021-08-31 PROCEDURE — 25010000002 PROPOFOL 10 MG/ML EMULSION: Performed by: REGISTERED NURSE

## 2021-08-31 PROCEDURE — 0Y9M0ZZ DRAINAGE OF RIGHT FOOT, OPEN APPROACH: ICD-10-PCS | Performed by: STUDENT IN AN ORGANIZED HEALTH CARE EDUCATION/TRAINING PROGRAM

## 2021-08-31 PROCEDURE — 25010000002 ENOXAPARIN PER 10 MG: Performed by: STUDENT IN AN ORGANIZED HEALTH CARE EDUCATION/TRAINING PROGRAM

## 2021-08-31 PROCEDURE — 25010000002 VANCOMYCIN 750 MG RECONSTITUTED SOLUTION 1 EACH VIAL: Performed by: FAMILY MEDICINE

## 2021-08-31 PROCEDURE — 76942 ECHO GUIDE FOR BIOPSY: CPT | Performed by: STUDENT IN AN ORGANIZED HEALTH CARE EDUCATION/TRAINING PROGRAM

## 2021-08-31 PROCEDURE — 25010000002 DEXAMETHASONE PER 1 MG: Performed by: REGISTERED NURSE

## 2021-08-31 PROCEDURE — 63710000001 INSULIN DETEMIR PER 5 UNITS: Performed by: STUDENT IN AN ORGANIZED HEALTH CARE EDUCATION/TRAINING PROGRAM

## 2021-08-31 PROCEDURE — 25010000002 VANCOMYCIN PER 500 MG: Performed by: FAMILY MEDICINE

## 2021-08-31 PROCEDURE — 82962 GLUCOSE BLOOD TEST: CPT

## 2021-08-31 PROCEDURE — 25010000002 CEFEPIME-DEXTROSE 2-5 GM-%(50ML) RECONSTITUTED SOLUTION: Performed by: INTERNAL MEDICINE

## 2021-08-31 PROCEDURE — 99233 SBSQ HOSP IP/OBS HIGH 50: CPT | Performed by: INTERNAL MEDICINE

## 2021-08-31 PROCEDURE — 25010000002 FENTANYL CITRATE (PF) 50 MCG/ML SOLUTION: Performed by: REGISTERED NURSE

## 2021-08-31 PROCEDURE — 25010000002 PHENYLEPHRINE PER 1 ML: Performed by: REGISTERED NURSE

## 2021-08-31 PROCEDURE — 25010000002 MIDAZOLAM PER 1MG: Performed by: REGISTERED NURSE

## 2021-08-31 PROCEDURE — 94799 UNLISTED PULMONARY SVC/PX: CPT

## 2021-08-31 PROCEDURE — 25010000002 ROPIVACAINE PER 1 MG: Performed by: NURSE ANESTHETIST, CERTIFIED REGISTERED

## 2021-08-31 PROCEDURE — 87070 CULTURE OTHR SPECIMN AEROBIC: CPT | Performed by: STUDENT IN AN ORGANIZED HEALTH CARE EDUCATION/TRAINING PROGRAM

## 2021-08-31 PROCEDURE — 73630 X-RAY EXAM OF FOOT: CPT

## 2021-08-31 PROCEDURE — 87186 SC STD MICRODIL/AGAR DIL: CPT | Performed by: STUDENT IN AN ORGANIZED HEALTH CARE EDUCATION/TRAINING PROGRAM

## 2021-08-31 PROCEDURE — 87147 CULTURE TYPE IMMUNOLOGIC: CPT | Performed by: STUDENT IN AN ORGANIZED HEALTH CARE EDUCATION/TRAINING PROGRAM

## 2021-08-31 PROCEDURE — 80048 BASIC METABOLIC PNL TOTAL CA: CPT | Performed by: INTERNAL MEDICINE

## 2021-08-31 PROCEDURE — 85025 COMPLETE CBC W/AUTO DIFF WBC: CPT | Performed by: INTERNAL MEDICINE

## 2021-08-31 PROCEDURE — 87205 SMEAR GRAM STAIN: CPT | Performed by: STUDENT IN AN ORGANIZED HEALTH CARE EDUCATION/TRAINING PROGRAM

## 2021-08-31 RX ORDER — PROPOFOL 10 MG/ML
VIAL (ML) INTRAVENOUS AS NEEDED
Status: DISCONTINUED | OUTPATIENT
Start: 2021-08-31 | End: 2021-08-31 | Stop reason: SURG

## 2021-08-31 RX ORDER — KETAMINE HYDROCHLORIDE 100 MG/ML
INJECTION INTRAMUSCULAR; INTRAVENOUS AS NEEDED
Status: DISCONTINUED | OUTPATIENT
Start: 2021-08-31 | End: 2021-08-31 | Stop reason: SURG

## 2021-08-31 RX ORDER — LIDOCAINE HYDROCHLORIDE 20 MG/ML
INJECTION, SOLUTION EPIDURAL; INFILTRATION; INTRACAUDAL; PERINEURAL AS NEEDED
Status: DISCONTINUED | OUTPATIENT
Start: 2021-08-31 | End: 2021-08-31 | Stop reason: SURG

## 2021-08-31 RX ORDER — GLIPIZIDE 2.5 MG/1
2.5 TABLET, EXTENDED RELEASE ORAL DAILY
COMMUNITY
End: 2021-09-03 | Stop reason: HOSPADM

## 2021-08-31 RX ORDER — FENTANYL CITRATE 50 UG/ML
25 INJECTION, SOLUTION INTRAMUSCULAR; INTRAVENOUS
Status: DISCONTINUED | OUTPATIENT
Start: 2021-08-31 | End: 2021-08-31 | Stop reason: HOSPADM

## 2021-08-31 RX ORDER — ONDANSETRON 2 MG/ML
4 INJECTION INTRAMUSCULAR; INTRAVENOUS ONCE AS NEEDED
Status: DISCONTINUED | OUTPATIENT
Start: 2021-08-31 | End: 2021-08-31 | Stop reason: HOSPADM

## 2021-08-31 RX ORDER — MAGNESIUM HYDROXIDE 1200 MG/15ML
LIQUID ORAL AS NEEDED
Status: DISCONTINUED | OUTPATIENT
Start: 2021-08-31 | End: 2021-08-31 | Stop reason: HOSPADM

## 2021-08-31 RX ORDER — HYDROCODONE BITARTRATE AND ACETAMINOPHEN 7.5; 325 MG/1; MG/1
1 TABLET ORAL ONCE AS NEEDED
Status: COMPLETED | OUTPATIENT
Start: 2021-08-31 | End: 2021-08-31

## 2021-08-31 RX ORDER — ROPIVACAINE HYDROCHLORIDE 5 MG/ML
INJECTION, SOLUTION EPIDURAL; INFILTRATION; PERINEURAL
Status: COMPLETED | OUTPATIENT
Start: 2021-08-31 | End: 2021-08-31

## 2021-08-31 RX ORDER — ONDANSETRON 2 MG/ML
4 INJECTION INTRAMUSCULAR; INTRAVENOUS ONCE AS NEEDED
Status: COMPLETED | OUTPATIENT
Start: 2021-08-31 | End: 2021-08-31

## 2021-08-31 RX ORDER — MIDAZOLAM HYDROCHLORIDE 2 MG/2ML
2 INJECTION, SOLUTION INTRAMUSCULAR; INTRAVENOUS
Status: DISCONTINUED | OUTPATIENT
Start: 2021-08-31 | End: 2021-08-31 | Stop reason: HOSPADM

## 2021-08-31 RX ORDER — DEXAMETHASONE SODIUM PHOSPHATE 4 MG/ML
4 INJECTION, SOLUTION INTRA-ARTICULAR; INTRALESIONAL; INTRAMUSCULAR; INTRAVENOUS; SOFT TISSUE ONCE AS NEEDED
Status: COMPLETED | OUTPATIENT
Start: 2021-08-31 | End: 2021-08-31

## 2021-08-31 RX ORDER — DEXMEDETOMIDINE HYDROCHLORIDE 100 UG/ML
INJECTION, SOLUTION INTRAVENOUS AS NEEDED
Status: DISCONTINUED | OUTPATIENT
Start: 2021-08-31 | End: 2021-08-31 | Stop reason: SURG

## 2021-08-31 RX ORDER — FENTANYL CITRATE 50 UG/ML
INJECTION, SOLUTION INTRAMUSCULAR; INTRAVENOUS AS NEEDED
Status: DISCONTINUED | OUTPATIENT
Start: 2021-08-31 | End: 2021-08-31 | Stop reason: SURG

## 2021-08-31 RX ORDER — SODIUM CHLORIDE, SODIUM LACTATE, POTASSIUM CHLORIDE, CALCIUM CHLORIDE 600; 310; 30; 20 MG/100ML; MG/100ML; MG/100ML; MG/100ML
100 INJECTION, SOLUTION INTRAVENOUS CONTINUOUS
Status: DISCONTINUED | OUTPATIENT
Start: 2021-08-31 | End: 2021-09-02

## 2021-08-31 RX ORDER — FAMOTIDINE 10 MG/ML
20 INJECTION, SOLUTION INTRAVENOUS
Status: COMPLETED | OUTPATIENT
Start: 2021-08-31 | End: 2021-08-31

## 2021-08-31 RX ORDER — SODIUM CHLORIDE, SODIUM LACTATE, POTASSIUM CHLORIDE, CALCIUM CHLORIDE 600; 310; 30; 20 MG/100ML; MG/100ML; MG/100ML; MG/100ML
INJECTION, SOLUTION INTRAVENOUS CONTINUOUS PRN
Status: DISCONTINUED | OUTPATIENT
Start: 2021-08-31 | End: 2021-08-31 | Stop reason: SURG

## 2021-08-31 RX ADMIN — ENOXAPARIN SODIUM 40 MG: 40 INJECTION SUBCUTANEOUS at 23:03

## 2021-08-31 RX ADMIN — KETAMINE HYDROCHLORIDE 10 MG: 100 INJECTION INTRAMUSCULAR; INTRAVENOUS at 14:08

## 2021-08-31 RX ADMIN — METFORMIN HYDROCHLORIDE 1000 MG: 500 TABLET ORAL at 18:19

## 2021-08-31 RX ADMIN — INSULIN DETEMIR 20 UNITS: 100 INJECTION, SOLUTION SUBCUTANEOUS at 23:27

## 2021-08-31 RX ADMIN — LIDOCAINE HYDROCHLORIDE 50 MG: 20 INJECTION, SOLUTION EPIDURAL; INFILTRATION; INTRACAUDAL; PERINEURAL at 14:08

## 2021-08-31 RX ADMIN — LISINOPRIL 10 MG: 10 TABLET ORAL at 08:40

## 2021-08-31 RX ADMIN — KETAMINE HYDROCHLORIDE 10 MG: 100 INJECTION INTRAMUSCULAR; INTRAVENOUS at 14:57

## 2021-08-31 RX ADMIN — ROPIVACAINE HYDROCHLORIDE 15 ML: 5 INJECTION, SOLUTION EPIDURAL; INFILTRATION; PERINEURAL at 14:00

## 2021-08-31 RX ADMIN — DEXAMETHASONE SODIUM PHOSPHATE 4 MG: 4 INJECTION, SOLUTION INTRAMUSCULAR; INTRAVENOUS at 13:44

## 2021-08-31 RX ADMIN — PROPOFOL 150 MG: 10 INJECTION, EMULSION INTRAVENOUS at 14:08

## 2021-08-31 RX ADMIN — MIDAZOLAM HYDROCHLORIDE 2 MG: 1 INJECTION, SOLUTION INTRAMUSCULAR; INTRAVENOUS at 13:49

## 2021-08-31 RX ADMIN — PHENYLEPHRINE HYDROCHLORIDE 100 MCG: 10 INJECTION, SOLUTION INTRAMUSCULAR; INTRAVENOUS; SUBCUTANEOUS at 14:33

## 2021-08-31 RX ADMIN — HYDROCODONE BITARTRATE AND ACETAMINOPHEN 1 TABLET: 7.5; 325 TABLET ORAL at 15:33

## 2021-08-31 RX ADMIN — METFORMIN HYDROCHLORIDE 1000 MG: 500 TABLET ORAL at 08:41

## 2021-08-31 RX ADMIN — GLIPIZIDE 5 MG: 5 TABLET ORAL at 08:41

## 2021-08-31 RX ADMIN — DEXMEDETOMIDINE 4 MCG: 100 INJECTION, SOLUTION, CONCENTRATE INTRAVENOUS at 14:16

## 2021-08-31 RX ADMIN — CEFEPIME HYDROCHLORIDE 2 G: 2 INJECTION, SOLUTION INTRAVENOUS at 23:02

## 2021-08-31 RX ADMIN — FAMOTIDINE 20 MG: 10 INJECTION INTRAVENOUS at 13:44

## 2021-08-31 RX ADMIN — SODIUM CHLORIDE, PRESERVATIVE FREE 10 ML: 5 INJECTION INTRAVENOUS at 23:05

## 2021-08-31 RX ADMIN — PHENYLEPHRINE HYDROCHLORIDE 100 MCG: 10 INJECTION, SOLUTION INTRAMUSCULAR; INTRAVENOUS; SUBCUTANEOUS at 14:41

## 2021-08-31 RX ADMIN — FENTANYL CITRATE 25 MCG: 50 INJECTION INTRAMUSCULAR; INTRAVENOUS at 14:08

## 2021-08-31 RX ADMIN — ROPIVACAINE HYDROCHLORIDE 15 ML: 5 INJECTION, SOLUTION EPIDURAL; INFILTRATION; PERINEURAL at 13:56

## 2021-08-31 RX ADMIN — CEFEPIME HYDROCHLORIDE 2 G: 2 INJECTION, SOLUTION INTRAVENOUS at 08:43

## 2021-08-31 RX ADMIN — DEXMEDETOMIDINE 4 MCG: 100 INJECTION, SOLUTION, CONCENTRATE INTRAVENOUS at 14:57

## 2021-08-31 RX ADMIN — SODIUM CHLORIDE, POTASSIUM CHLORIDE, SODIUM LACTATE AND CALCIUM CHLORIDE: 600; 310; 30; 20 INJECTION, SOLUTION INTRAVENOUS at 14:07

## 2021-08-31 RX ADMIN — SODIUM CHLORIDE, POTASSIUM CHLORIDE, SODIUM LACTATE AND CALCIUM CHLORIDE 100 ML/HR: 600; 310; 30; 20 INJECTION, SOLUTION INTRAVENOUS at 23:17

## 2021-08-31 RX ADMIN — SODIUM CHLORIDE, PRESERVATIVE FREE 10 ML: 5 INJECTION INTRAVENOUS at 08:43

## 2021-08-31 RX ADMIN — GLIPIZIDE 5 MG: 5 TABLET ORAL at 18:19

## 2021-08-31 RX ADMIN — ONDANSETRON 4 MG: 2 INJECTION INTRAMUSCULAR; INTRAVENOUS at 13:44

## 2021-08-31 RX ADMIN — PRAVASTATIN SODIUM 40 MG: 20 TABLET ORAL at 08:42

## 2021-08-31 RX ADMIN — VANCOMYCIN HYDROCHLORIDE 1250 MG: 500 INJECTION, POWDER, LYOPHILIZED, FOR SOLUTION INTRAVENOUS at 09:33

## 2021-08-31 NOTE — ANESTHESIA POSTPROCEDURE EVALUATION
Patient: Rahel Mejias    Procedure Summary     Date: 08/31/21 Room / Location: [facility] OR 3 /  [facility] OR    Anesthesia Start: 1407 Anesthesia Stop: 1524    Procedure: INCISION AND DRAINAGE WOUND RIGHT FOOT (Right Foot) Diagnosis:       Cellulitis of right foot      (Cellulitis of right foot [L03.115])    Surgeons: Ander Andrade DPM Provider:     Anesthesia Type: general with block ASA Status: 3          Anesthesia Type: general with block    Vitals  Vitals Value Taken Time   /97 08/31/21 1610   Temp 97.8 °F (36.6 °C) 08/31/21 1538   Pulse 94 08/31/21 1616   Resp 12 08/31/21 1605   SpO2 97 % 08/31/21 1616   Vitals shown include unvalidated device data.        Post Anesthesia Care and Evaluation    Patient location during evaluation: PACU  Patient participation: complete - patient participated  Level of consciousness: awake and alert  Pain score: 0  Pain management: satisfactory to patient  Airway patency: patent  Anesthetic complications: No anesthetic complications  PONV Status: none  Cardiovascular status: acceptable  Respiratory status: acceptable  Hydration status: acceptable

## 2021-08-31 NOTE — ANESTHESIA PROCEDURE NOTES
Peripheral Block      Patient reassessed immediately prior to procedure    Patient location during procedure: pre-op  Start time: 8/31/2021 1:58 PM  Stop time: 8/31/2021 2:00 PM  Reason for block: at surgeon's request and post-op pain management  Performed by  CRNA: Shahana Ibarra CRNA  Preanesthetic Checklist  Completed: patient identified, IV checked, site marked, risks and benefits discussed, surgical consent, monitors and equipment checked, pre-op evaluation and timeout performed  Prep:  Pt Position: supine  Sterile barriers:cap, gloves, mask and washed/disinfected hands  Prep: ChloraPrep  Patient monitoring: blood pressure monitoring, continuous pulse oximetry and EKG  Procedure  Sedation:yes  Performed under: local infiltration  Guidance:ultrasound guided  ULTRASOUND INTERPRETATION. Using ultrasound guidance a 21 G gauge needle was placed in close proximity to the nerve, at which point, under ultrasound guidance anesthetic was injected in the area of the nerve and spread of the anesthesia was seen on ultrasound in close proximity thereto.  There were no abnormalities seen on ultrasound; a digital image was taken; and the patient tolerated the procedure with no complications.   Laterality:right  Block Type:popliteal  Injection Technique:single-shot  Needle Type:echogenic  Needle Gauge:21 G  Resistance on Injection: none    Medications Used: ropivacaine (NAROPIN) injection 0.5 %, 15 mL  Med admintered at 8/31/2021 2:00 PM      Post Assessment  Injection Assessment: negative aspiration for heme, no paresthesia on injection and incremental injection  Patient Tolerance:comfortable throughout block  Complications:no

## 2021-08-31 NOTE — ANESTHESIA PREPROCEDURE EVALUATION
Anesthesia Evaluation     Patient summary reviewed and Nursing notes reviewed   NPO Solid Status: > 8 hours  NPO Liquid Status: > 8 hours           Airway   Mallampati: II  TM distance: >3 FB  Neck ROM: full  No difficulty expected  Dental - normal exam     Pulmonary - normal exam   (+) a smoker Current, sleep apnea,   Cardiovascular - normal exam  Exercise tolerance: poor (<4 METS)    ECG reviewed    (+) hyperlipidemia,     ROS comment: Wave Axis=   deg  - ABNORMAL ECG -  Sinus rhythm  Consider anteroseptal infarct  Non-specific STT wave change  NO SIGNIFICANT CHANGE FROM PREVIOUS ECG  Electronically Signed By: Marck Sparrow ([facility]) 30-Aug-2021 08:10:47  Date and Time of Study: 2021-08-29 15:57:04    Neuro/Psych  (+) psychiatric history Depression,     GI/Hepatic/Renal/Endo    (+)  GERD poorly controlled,  diabetes mellitus type 2 poorly controlled,     Musculoskeletal     (+) radiculopathy Left upper extremity, Right upper extremity, Right lower extremity and Left lower extremity  Abdominal  - normal exam   Substance History      OB/GYN          Other   arthritis,                      Anesthesia Plan    ASA 3     general with block     intravenous induction     Anesthetic plan, all risks, benefits, and alternatives have been provided, discussed and informed consent has been obtained with: patient.  Use of blood products discussed with patient  Consented to blood products.

## 2021-09-01 LAB
ANION GAP SERPL CALCULATED.3IONS-SCNC: 8.7 MMOL/L (ref 5–15)
BASOPHILS # BLD AUTO: 0.05 10*3/MM3 (ref 0–0.2)
BASOPHILS NFR BLD AUTO: 0.3 % (ref 0–1.5)
BUN SERPL-MCNC: 26 MG/DL (ref 6–20)
BUN/CREAT SERPL: 21.1 (ref 7–25)
CALCIUM SPEC-SCNC: 8.9 MG/DL (ref 8.6–10.5)
CHLORIDE SERPL-SCNC: 102 MMOL/L (ref 98–107)
CO2 SERPL-SCNC: 23.3 MMOL/L (ref 22–29)
CREAT SERPL-MCNC: 1.23 MG/DL (ref 0.57–1)
DEPRECATED RDW RBC AUTO: 45.3 FL (ref 37–54)
EOSINOPHIL # BLD AUTO: 0.01 10*3/MM3 (ref 0–0.4)
EOSINOPHIL NFR BLD AUTO: 0.1 % (ref 0.3–6.2)
ERYTHROCYTE [DISTWIDTH] IN BLOOD BY AUTOMATED COUNT: 13.4 % (ref 12.3–15.4)
GFR SERPL CREATININE-BSD FRML MDRD: 48 ML/MIN/1.73
GLUCOSE BLDC GLUCOMTR-MCNC: 184 MG/DL (ref 70–130)
GLUCOSE BLDC GLUCOMTR-MCNC: 196 MG/DL (ref 70–130)
GLUCOSE BLDC GLUCOMTR-MCNC: 209 MG/DL (ref 70–130)
GLUCOSE BLDC GLUCOMTR-MCNC: 250 MG/DL (ref 70–130)
GLUCOSE SERPL-MCNC: 204 MG/DL (ref 65–99)
HCT VFR BLD AUTO: 34 % (ref 34–46.6)
HGB BLD-MCNC: 10.8 G/DL (ref 12–15.9)
IMM GRANULOCYTES # BLD AUTO: 0.18 10*3/MM3 (ref 0–0.05)
IMM GRANULOCYTES NFR BLD AUTO: 1.1 % (ref 0–0.5)
LYMPHOCYTES # BLD AUTO: 1.59 10*3/MM3 (ref 0.7–3.1)
LYMPHOCYTES NFR BLD AUTO: 10.1 % (ref 19.6–45.3)
MAGNESIUM SERPL-MCNC: 1.9 MG/DL (ref 1.6–2.6)
MCH RBC QN AUTO: 29.2 PG (ref 26.6–33)
MCHC RBC AUTO-ENTMCNC: 31.8 G/DL (ref 31.5–35.7)
MCV RBC AUTO: 91.9 FL (ref 79–97)
MONOCYTES # BLD AUTO: 1.22 10*3/MM3 (ref 0.1–0.9)
MONOCYTES NFR BLD AUTO: 7.8 % (ref 5–12)
NEUTROPHILS NFR BLD AUTO: 12.67 10*3/MM3 (ref 1.7–7)
NEUTROPHILS NFR BLD AUTO: 80.6 % (ref 42.7–76)
NRBC BLD AUTO-RTO: 0 /100 WBC (ref 0–0.2)
PLATELET # BLD AUTO: 328 10*3/MM3 (ref 140–450)
PMV BLD AUTO: 10.2 FL (ref 6–12)
POTASSIUM SERPL-SCNC: 4.6 MMOL/L (ref 3.5–5.2)
RBC # BLD AUTO: 3.7 10*6/MM3 (ref 3.77–5.28)
SODIUM SERPL-SCNC: 134 MMOL/L (ref 136–145)
VANCOMYCIN SERPL-MCNC: 12.7 MCG/ML (ref 5–40)
WBC # BLD AUTO: 15.72 10*3/MM3 (ref 3.4–10.8)

## 2021-09-01 PROCEDURE — 25010000002 ENOXAPARIN PER 10 MG: Performed by: STUDENT IN AN ORGANIZED HEALTH CARE EDUCATION/TRAINING PROGRAM

## 2021-09-01 PROCEDURE — 25010000002 VANCOMYCIN 750 MG RECONSTITUTED SOLUTION 1 EACH VIAL: Performed by: FAMILY MEDICINE

## 2021-09-01 PROCEDURE — 63710000001 INSULIN ASPART PER 5 UNITS: Performed by: STUDENT IN AN ORGANIZED HEALTH CARE EDUCATION/TRAINING PROGRAM

## 2021-09-01 PROCEDURE — 80202 ASSAY OF VANCOMYCIN: CPT | Performed by: FAMILY MEDICINE

## 2021-09-01 PROCEDURE — 25010000002 CEFEPIME-DEXTROSE 2-5 GM-%(50ML) RECONSTITUTED SOLUTION: Performed by: INTERNAL MEDICINE

## 2021-09-01 PROCEDURE — 99233 SBSQ HOSP IP/OBS HIGH 50: CPT | Performed by: INTERNAL MEDICINE

## 2021-09-01 PROCEDURE — 63710000001 INSULIN DETEMIR PER 5 UNITS: Performed by: STUDENT IN AN ORGANIZED HEALTH CARE EDUCATION/TRAINING PROGRAM

## 2021-09-01 PROCEDURE — 25010000002 VANCOMYCIN PER 500 MG: Performed by: FAMILY MEDICINE

## 2021-09-01 PROCEDURE — 94799 UNLISTED PULMONARY SVC/PX: CPT

## 2021-09-01 PROCEDURE — 85025 COMPLETE CBC W/AUTO DIFF WBC: CPT | Performed by: INTERNAL MEDICINE

## 2021-09-01 PROCEDURE — 80048 BASIC METABOLIC PNL TOTAL CA: CPT | Performed by: INTERNAL MEDICINE

## 2021-09-01 PROCEDURE — 83735 ASSAY OF MAGNESIUM: CPT | Performed by: HOSPITALIST

## 2021-09-01 PROCEDURE — 82962 GLUCOSE BLOOD TEST: CPT

## 2021-09-01 RX ADMIN — GLIPIZIDE 5 MG: 5 TABLET ORAL at 18:04

## 2021-09-01 RX ADMIN — INSULIN ASPART 4 UNITS: 100 INJECTION, SOLUTION INTRAVENOUS; SUBCUTANEOUS at 08:49

## 2021-09-01 RX ADMIN — VANCOMYCIN HYDROCHLORIDE 1250 MG: 500 INJECTION, POWDER, LYOPHILIZED, FOR SOLUTION INTRAVENOUS at 10:20

## 2021-09-01 RX ADMIN — ACETAMINOPHEN 650 MG: 325 TABLET, FILM COATED ORAL at 18:04

## 2021-09-01 RX ADMIN — PANTOPRAZOLE SODIUM 40 MG: 40 TABLET, DELAYED RELEASE ORAL at 08:48

## 2021-09-01 RX ADMIN — SODIUM CHLORIDE, PRESERVATIVE FREE 10 ML: 5 INJECTION INTRAVENOUS at 08:48

## 2021-09-01 RX ADMIN — PRAVASTATIN SODIUM 40 MG: 20 TABLET ORAL at 08:49

## 2021-09-01 RX ADMIN — LISINOPRIL 10 MG: 10 TABLET ORAL at 08:49

## 2021-09-01 RX ADMIN — INSULIN ASPART 2 UNITS: 100 INJECTION, SOLUTION INTRAVENOUS; SUBCUTANEOUS at 18:04

## 2021-09-01 RX ADMIN — METFORMIN HYDROCHLORIDE 1000 MG: 500 TABLET ORAL at 18:04

## 2021-09-01 RX ADMIN — METFORMIN HYDROCHLORIDE 1000 MG: 500 TABLET ORAL at 08:49

## 2021-09-01 RX ADMIN — INSULIN DETEMIR 20 UNITS: 100 INJECTION, SOLUTION SUBCUTANEOUS at 21:01

## 2021-09-01 RX ADMIN — GLIPIZIDE 5 MG: 5 TABLET ORAL at 08:49

## 2021-09-01 RX ADMIN — CEFEPIME HYDROCHLORIDE 2 G: 2 INJECTION, SOLUTION INTRAVENOUS at 08:49

## 2021-09-01 RX ADMIN — ENOXAPARIN SODIUM 40 MG: 40 INJECTION SUBCUTANEOUS at 23:06

## 2021-09-01 RX ADMIN — INSULIN ASPART 6 UNITS: 100 INJECTION, SOLUTION INTRAVENOUS; SUBCUTANEOUS at 12:52

## 2021-09-01 RX ADMIN — SODIUM CHLORIDE, PRESERVATIVE FREE 10 ML: 5 INJECTION INTRAVENOUS at 21:04

## 2021-09-01 NOTE — PROGRESS NOTES
Pharmacy dosing vancomycin for diabetic foot ulcer    Last vancomycin level 9/1 was 12.7. Goal is 15-20    Modified order to Vancomycin 1250mg IV q18h and will recheck level 9/2 @1900

## 2021-09-01 NOTE — PLAN OF CARE
Goal Outcome Evaluation:  Plan of Care Reviewed With: patient        Progress: improving  Outcome Summary: patient vss, no complaints of pain. blood sugar monitored.  foot wrapper per provider, and possibly going to close the wound tomorrow. antibiotics continued

## 2021-09-01 NOTE — CASE MANAGEMENT/SOCIAL WORK
Continued Stay Note  RAMIRO Montero     Patient Name: Rahel Mejias  MRN: [id number]  Today's Date: 9/1/2021    Admit Date: 8/28/2021    Discharge Plan     Row Name 09/01/21 3370       Plan    Plan  plan home with     Plan Comments  Per chart review, Dr Andrade will asess wound tomorrow and possibly close wound. Anticipate home with boot for heel weight bearing for transfers only and follow up outpatient. CM will continue to follow for dc needs.        Discharge Codes    No documentation.             Noman Hartman RN

## 2021-09-01 NOTE — PLAN OF CARE
Goal Outcome Evaluation:  Plan of Care Reviewed With: patient        Progress: improving  Outcome Summary: Pt POD #1 VSS; no complaints of pain. Pt still numb only allowed to stand and Pivot to BSC; Tolerated very well. will continue to monitor.

## 2021-09-01 NOTE — PROGRESS NOTES
"SERVICE: [facility] HOSPITALIST    CONSULTANTS: Podiatry    CHIEF COMPLAINT: Right foot pain    SUBJECTIVE: Patient seen and examined at bedside.  Patient underwent incision and drainage by podiatry yesterday and 8/31/2021.  She reports that she is doing well this morning and that her foot is completely numb from procedure.  She otherwise denies complaints. Patient denies headache, fever or chills, nausea, vomiting, diarrhea, abdominal pain, chest pain or palpitations, shortness of breath, wheezing or coughing,  weakness.     OBJECTIVE:  Physical exam is grossly unchanged from previous day, 8/31/2021, except where noted below.    Physical Exam:  General: Patient is awake and alert. Well developed and well nourished. No acute distress noted.   HENT: Head is atraumatic, normocephalic. Hearing is grossly intact. Nose is without obvious congestion and appears patent. Neck is supple and trachea is midline.   Eyes: Vision is grossly intact. Pupils appear equal and round.   Cardiovascular: Heart has regular rate and rhythm with no murmurs, rubs or gallops noted.   Respiratory: Lungs are clear to ausculation without wheezes, rhonchi or rales.   Abdominal/GI: Soft, non-tender, bowel sounds present. No rebound or guarding present.   Extremities: No peripheral edema noted.  Patient's right foot is wrapped in Ace bandaging.  Difficult to visualize, no current drainage visible on bandaging .   musculoskeletal: Spontaneous movement of bilateral upper and lower extremities against gravity noted. No signs of injury or deformity noted.   Skin: Warm and dry.   Psych: Mood and affect are appropriate. Cooperative with exam.   Neuro: No facial asymmetry noted. No focal deficits noted, hearing and vision are grossly intact.     /91 (BP Location: Right arm, Patient Position: Lying)   Pulse 89   Temp 97.9 °F (36.6 °C) (Oral)   Resp 16   Ht 165.1 cm (65\")   Wt 93.5 kg (206 lb 3.2 oz)   LMP 07/13/2021   SpO2 " 96%   BMI 34.31 kg/m²     MEDS/LABS REVIEWED AND ORDERED    Cefepime-Dextrose, 2 g, Intravenous, Q12H  enoxaparin, 40 mg, Subcutaneous, Q24H  glipizide, 5 mg, Oral, BID AC  insulin aspart, 0-9 Units, Subcutaneous, TID AC  insulin detemir, 20 Units, Subcutaneous, Nightly  lisinopril, 10 mg, Oral, Q24H  metFORMIN, 1,000 mg, Oral, BID With Meals  pantoprazole, 40 mg, Oral, QAM  pravastatin, 40 mg, Oral, Daily  sodium chloride, 10 mL, Intravenous, Q12H  vancomycin, 1,250 mg, Intravenous, Q24H      LAB/DIAGNOSTICS:    Lab Results (last 24 hours)     Procedure Component Value Units Date/Time    CBC & Differential [[id number]]  (Abnormal) Collected: 09/01/21 0704    Specimen: Blood Updated: 09/01/21 0712    Narrative:      The following orders were created for panel order CBC & Differential.  Procedure                               Abnormality         Status                     ---------                               -----------         ------                     CBC Auto Differential[[id number]]        Abnormal            Final result                 Please view results for these tests on the individual orders.    CBC Auto Differential [[id number]]  (Abnormal) Collected: 09/01/21 0704    Specimen: Blood Updated: 09/01/21 0712     WBC 15.72 10*3/mm3      RBC 3.70 10*6/mm3      Hemoglobin 10.8 g/dL      Hematocrit 34.0 %      MCV 91.9 fL      MCH 29.2 pg      MCHC 31.8 g/dL      RDW 13.4 %      RDW-SD 45.3 fl      MPV 10.2 fL      Platelets 328 10*3/mm3      Neutrophil % 80.6 %      Lymphocyte % 10.1 %      Monocyte % 7.8 %      Eosinophil % 0.1 %      Basophil % 0.3 %      Immature Grans % 1.1 %      Neutrophils, Absolute 12.67 10*3/mm3      Lymphocytes, Absolute 1.59 10*3/mm3      Monocytes, Absolute 1.22 10*3/mm3      Eosinophils, Absolute 0.01 10*3/mm3      Basophils, Absolute 0.05 10*3/mm3      Immature Grans, Absolute 0.18 10*3/mm3      nRBC 0.0 /100 WBC     Vancomycin, Random [[id number]] Collected: 09/01/21 0704     Specimen: Blood Updated: 09/01/21 0707    Magnesium [[id number]] Collected: 09/01/21 0704    Specimen: Blood Updated: 09/01/21 0707    Basic Metabolic Panel [[id number]] Collected: 09/01/21 0704    Specimen: Blood Updated: 09/01/21 0707    POC Glucose Once [[id number]]  (Abnormal) Collected: 08/31/21 2017    Specimen: Blood Updated: 08/31/21 2023     Glucose 385 mg/dL      Comment: Meter: [id number] : [id number] Jonatan Raza  CNDUSTIN       Wound Culture - Wound, Foot, Right [[id number]] Collected: 08/31/21 1501    Specimen: Wound from Foot, Right Updated: 08/31/21 1957     Gram Stain No WBCs seen      No organisms seen    Blood Culture - Blood, Arm, Left [[id number]] Collected: 08/28/21 1921    Specimen: Blood from Arm, Left Updated: 08/31/21 1945     Blood Culture No growth at 3 days    Blood Culture - Blood, Hand, Left [[id number]] Collected: 08/28/21 1921    Specimen: Blood from Hand, Left Updated: 08/31/21 1945     Blood Culture No growth at 3 days    POC Glucose Once [[id number]]  (Abnormal) Collected: 08/31/21 1652    Specimen: Blood Updated: 08/31/21 1657     Glucose 142 mg/dL      Comment: Meter: [id number] : [id number] StarsVui NA       POC Glucose Once [[id number]]  (Abnormal) Collected: 08/31/21 1152    Specimen: Blood Updated: 08/31/21 1203     Glucose 148 mg/dL      Comment: Meter: [id number] : [id number] Flores Christina NA           ECG 12 Lead   Final Result   HEART RATE= 93  bpm   RR Interval= 644  ms   MA Interval= 182  ms   P Horizontal Axis= 15  deg   P Front Axis= 59  deg   QRSD Interval= 72  ms   QT Interval= 360  ms   QRS Axis= 25  deg   T Wave Axis=   deg   - ABNORMAL ECG -   Sinus rhythm   Consider anteroseptal infarct   Non-specific STT wave change   NO SIGNIFICANT CHANGE FROM PREVIOUS ECG   Electronically Signed By: [facility] 30-Aug-2021 08:10:47   Date and Time of Study: 2021-08-29 15:57:04          XR Foot 3+ View Right    Result Date: 8/31/2021  No fracture. Mottled  lucency of the sesamoid bone adjacent to the head of the first metatarsal along the fibular aspect is similar to prior study. First metatarsal normal. Signer Name: Mee Ramirez MD  Signed: 8/31/2021 3:49 PM  Workstation Name: [id number]  [facility]    MRI Foot Right With & Without Contrast    Result Date: 8/30/2021  1. Mild ill-defined edema, and enhancement in the sesamoid bones.  In the setting of an overlying soft tissue infection, the appearance is suspicious for early osteomyelitis although there are no definite bony destructive changes. Mild ill-defined marrow edema is also seen in the medial aspect of the head of the first metatarsal. 2. The remaining osseous structures including the second and third metatarsals where the patient indicated the area of concern appears normal in morphology and signal intensity. 3. Extensive edema and mild enhancement in the plantar and medial soft tissues at the level of the distal first metatarsal and first metatarsophalangeal joint, which could reflect infection or inflammation. There is no discrete mass or fluid collection such as an abscess. 4. Edema in the interosseous muscles and plantar muscles as described above. Diffuse dorsal subcutaneous edema is noted. Signer Name: Fang Gee MD  Signed: 8/30/2021 4:27 PM  Workstation Name: [facility]  [facility] Venous Doppler Lower Extremity Right (duplex)    Result Date: 8/30/2021  Negative examination.  No evidence of right lower extremity DVT.  This report was finalized on 8/30/2021 2:38 PM by Dr. Jorge A Persaud MD.          ASSESSMENT/PLAN:  Please not portions of this assessment/plan may have been copied and pasted, but I have personally seen this patient and reviewed each line of this assessment and plan for accuracy and made updates to reflect my necessary changes on 09/01/21 .     Right foot diabetic foot ulcer and cellulitis with apparent abscess and  "likely early osteomyelitis as per MRI, status post incision and drainage 8/31/2021  -Wound culture shows S. Aureus sensitive to vancomycin, will continue as per below. Also sensitive to clindamycin may be able to use oral at later time.   -Patient currently on IV vancomycin and cefepime. Will discontinue cefepime today.   -Podiatry consulted and has performed surgical debridement on 8/31/21 with plans for closure tomorrow 9/2/2021.   -Per podiatry during surgical procedure unlikely osteomyelitis.   -Waiting repeat cultures from surgical procedure, consider ID consult for antibiotic recommendations at discharge.     Diabetes mellitus type 2-patient noncompliant with insulin regimen, continue Levemir 20 units daily with sliding scale, glucose is currently stable.  Patient on home Metformin and Glucotrol, with adjustment of Glucotrol to 5 mg twice daily.     Obesity-BMI 34.3.     Hyperlipidemia-lipid panel showing triglycerides 280, HDL 29, LDL 70, VLDL 45, total cholesterol 144.  Continue pravastatin. Could consider increasing but will wait and defer to PCP as patient will likely have some lower extremity pain post surgery.       Tobacco abuse: lengthy discussion at bedside regarding smoking cessation. Patient is happy she has not required a nicotine patch or cigarette in several days and states she will try to quit at home.        PLAN FOR DISPOSITION: MYKE Nicolas DO  Hospitalist, [facility]  09/01/21  07:44 EDT    \"Dictated utilizing Dragon dictation\"    "

## 2021-09-01 NOTE — PROGRESS NOTES
Assessment/Plan  Assessment/Plan      Right foot cellulitis 1 day status post incision drainage;  Patient seen and evaluated.  I removed the packing and placed new packing in the wound.  Foot was redressed.  Leave this dressing clean, dry, intact and I will change tomorrow.  As long as the wound looks good, I will reapproximate with the sutures I placed during surgery tomorrow during dressing change.  Intra-Op wound cultures are pending, but I suspect they will be staph aureus like the prior cultures.  Continue antibiotics per primary.  She can follow-up with me as an outpatient after discharge.      She will need to be off work for 2 weeks following discharge to keep her foot elevated minimize weightbearing to promote healing.  She has a postoperative shoe from her last hospitalization, she brought it with her and I instructed her to use it when weightbearing.  I also instructed her to be heel weightbearing for transfers only.      Ander Andrade DPM  09/01/21  07:47 EDT    Please call or text with questions. [phone]      -----------------------------------------------------------------------------------------------------------------------------  Chief complaint right foot cellulitis and wound    Subjective     History of Present Illness:  Patient is a 40 y.o. female seen 1 day status post incision and drainage of right foot wound.  Patient relates no acute events overnight.  Relates that her foot is still numb from the block.  Has no pain in her leg and is unable to wiggle her toes.  Denies nausea, vomiting, fever, chills, shortness of breath, chest pain.    Family History   Problem Relation Age of Onset   • Diabetes Mother    • Cancer Mother    • Diabetes Father    • Heart disease Father    • Hypertension Father    • Diabetes Maternal Grandfather    • Diabetes Paternal Grandmother      Social History     Socioeconomic History   • Marital status:      Spouse name: Not on file   • Number of  children: Not on file   • Years of education: Not on file   • Highest education level: Not on file   Tobacco Use   • Smoking status: Current Every Day Smoker     Packs/day: 0.50     Years: 12.00     Pack years: 6.00   • Smokeless tobacco: Never Used   Vaping Use   • Vaping Use: Never used   Substance and Sexual Activity   • Alcohol use: No   • Drug use: Never   • Sexual activity: Yes     Partners: Male     Birth control/protection: I.U.D.     Comment: Mirena x 11 years      Past Medical History:   Diagnosis Date   • Arthritis    • Depression    • Diabetes (CMS/HCC)    • GERD (gastroesophageal reflux disease)    • Hyperlipidemia    • ABBIE (obstructive sleep apnea)      Past Surgical History:   Procedure Laterality Date   •  SECTION     • EYE SURGERY     • FOOT SURGERY  2018   • REFRACTIVE SURGERY  2018     Medications Prior to Admission   Medication Sig Dispense Refill Last Dose   • glipizide (GLUCOTROL XL) 2.5 MG 24 hr tablet Take 2.5 mg by mouth Daily.      • insulin glargine (LANTUS, SEMGLEE) 100 UNIT/ML injection Inject 50 Units under the skin into the appropriate area as directed Every Night.      • insulin lispro (humaLOG) 100 UNIT/ML injection Inject 10 Units under the skin into the appropriate area as directed 3 (Three) Times a Day.      • metFORMIN (GLUCOPHAGE) 1000 MG tablet Take 1,000 mg by mouth 2 (two) times a day with meals.      • omeprazole (priLOSEC) 20 MG capsule Take 20 mg by mouth Daily.      • pravastatin (PRAVACHOL) 40 MG tablet Take 40 mg by mouth Daily.        Sitagliptin, Clindamycin, and Clindamycin/lincomycin    Objective   Physical Exam:     Mental Status: AAOx3 and in NAD   Heart:  RRR   Lungs: Respirations nonlabored on room air   Abdomen: Nondistended   Other findings noted:      LE exam  Vascular: DP/PT pulses palpable right.  Capillary fill time less than 3 seconds to right hallux    Neurologic: Light touch sensation is absent to right foot secondary to popliteal block from  surgery yesterday    Dermatologic: Please see photo from today under media tab.  The medial surgical site remains coapted.  The plantar lateral first MPJ site is without drainage.  No purulence is noted from the wound.  Erythema is still noted to the first MPJ.      Musculoskeletal: Muscle strength 0/5 for plantar flexion, dorsiflexion, inversion, eversion, right secondary to popliteal block     Deformities: none  Tenderness to palpation: none

## 2021-09-02 ENCOUNTER — APPOINTMENT (OUTPATIENT)
Dept: ULTRASOUND IMAGING | Facility: HOSPITAL | Age: 41
End: 2021-09-02

## 2021-09-02 DIAGNOSIS — E11.628 DIABETIC FOOT INFECTION (HCC): Primary | ICD-10-CM

## 2021-09-02 DIAGNOSIS — L08.9 DIABETIC FOOT INFECTION (HCC): Primary | ICD-10-CM

## 2021-09-02 LAB
ANION GAP SERPL CALCULATED.3IONS-SCNC: 9.2 MMOL/L (ref 5–15)
BACTERIA SPEC AEROBE CULT: NORMAL
BACTERIA SPEC AEROBE CULT: NORMAL
BASOPHILS # BLD AUTO: 0.07 10*3/MM3 (ref 0–0.2)
BASOPHILS NFR BLD AUTO: 0.5 % (ref 0–1.5)
BUN SERPL-MCNC: 27 MG/DL (ref 6–20)
BUN/CREAT SERPL: 22.1 (ref 7–25)
CALCIUM SPEC-SCNC: 8.4 MG/DL (ref 8.6–10.5)
CHLORIDE SERPL-SCNC: 102 MMOL/L (ref 98–107)
CO2 SERPL-SCNC: 23.8 MMOL/L (ref 22–29)
CREAT SERPL-MCNC: 1.22 MG/DL (ref 0.57–1)
DEPRECATED RDW RBC AUTO: 46.7 FL (ref 37–54)
EOSINOPHIL # BLD AUTO: 0.24 10*3/MM3 (ref 0–0.4)
EOSINOPHIL NFR BLD AUTO: 1.8 % (ref 0.3–6.2)
ERYTHROCYTE [DISTWIDTH] IN BLOOD BY AUTOMATED COUNT: 13.6 % (ref 12.3–15.4)
GFR SERPL CREATININE-BSD FRML MDRD: 49 ML/MIN/1.73
GLUCOSE BLDC GLUCOMTR-MCNC: 103 MG/DL (ref 70–130)
GLUCOSE BLDC GLUCOMTR-MCNC: 124 MG/DL (ref 70–130)
GLUCOSE BLDC GLUCOMTR-MCNC: 142 MG/DL (ref 70–130)
GLUCOSE BLDC GLUCOMTR-MCNC: 91 MG/DL (ref 70–130)
GLUCOSE SERPL-MCNC: 98 MG/DL (ref 65–99)
HCT VFR BLD AUTO: 31.1 % (ref 34–46.6)
HGB BLD-MCNC: 9.7 G/DL (ref 12–15.9)
IMM GRANULOCYTES # BLD AUTO: 0.22 10*3/MM3 (ref 0–0.05)
IMM GRANULOCYTES NFR BLD AUTO: 1.6 % (ref 0–0.5)
LYMPHOCYTES # BLD AUTO: 3.15 10*3/MM3 (ref 0.7–3.1)
LYMPHOCYTES NFR BLD AUTO: 23 % (ref 19.6–45.3)
MCH RBC QN AUTO: 29.3 PG (ref 26.6–33)
MCHC RBC AUTO-ENTMCNC: 31.2 G/DL (ref 31.5–35.7)
MCV RBC AUTO: 94 FL (ref 79–97)
MONOCYTES # BLD AUTO: 1.45 10*3/MM3 (ref 0.1–0.9)
MONOCYTES NFR BLD AUTO: 10.6 % (ref 5–12)
NEUTROPHILS NFR BLD AUTO: 62.5 % (ref 42.7–76)
NEUTROPHILS NFR BLD AUTO: 8.54 10*3/MM3 (ref 1.7–7)
NRBC BLD AUTO-RTO: 0 /100 WBC (ref 0–0.2)
PLATELET # BLD AUTO: 314 10*3/MM3 (ref 140–450)
PMV BLD AUTO: 10.8 FL (ref 6–12)
POTASSIUM SERPL-SCNC: 4.6 MMOL/L (ref 3.5–5.2)
RBC # BLD AUTO: 3.31 10*6/MM3 (ref 3.77–5.28)
SODIUM SERPL-SCNC: 135 MMOL/L (ref 136–145)
WBC # BLD AUTO: 13.67 10*3/MM3 (ref 3.4–10.8)

## 2021-09-02 PROCEDURE — 63710000001 INSULIN DETEMIR PER 5 UNITS: Performed by: STUDENT IN AN ORGANIZED HEALTH CARE EDUCATION/TRAINING PROGRAM

## 2021-09-02 PROCEDURE — 82962 GLUCOSE BLOOD TEST: CPT

## 2021-09-02 PROCEDURE — 80048 BASIC METABOLIC PNL TOTAL CA: CPT | Performed by: INTERNAL MEDICINE

## 2021-09-02 PROCEDURE — 25010000002 CEFTRIAXONE SODIUM-DEXTROSE 1-3.74 GM-%(50ML) RECONSTITUTED SOLUTION: Performed by: HOSPITALIST

## 2021-09-02 PROCEDURE — 93923 UPR/LXTR ART STDY 3+ LVLS: CPT

## 2021-09-02 PROCEDURE — 99232 SBSQ HOSP IP/OBS MODERATE 35: CPT | Performed by: HOSPITALIST

## 2021-09-02 PROCEDURE — 94799 UNLISTED PULMONARY SVC/PX: CPT

## 2021-09-02 PROCEDURE — 25010000002 ENOXAPARIN PER 10 MG: Performed by: STUDENT IN AN ORGANIZED HEALTH CARE EDUCATION/TRAINING PROGRAM

## 2021-09-02 PROCEDURE — 85025 COMPLETE CBC W/AUTO DIFF WBC: CPT | Performed by: INTERNAL MEDICINE

## 2021-09-02 PROCEDURE — 25010000002 VANCOMYCIN PER 500 MG: Performed by: FAMILY MEDICINE

## 2021-09-02 PROCEDURE — 25010000002 ONDANSETRON PER 1 MG: Performed by: STUDENT IN AN ORGANIZED HEALTH CARE EDUCATION/TRAINING PROGRAM

## 2021-09-02 PROCEDURE — 25010000002 VANCOMYCIN 750 MG RECONSTITUTED SOLUTION 1 EACH VIAL: Performed by: FAMILY MEDICINE

## 2021-09-02 RX ORDER — CEFTRIAXONE 1 G/50ML
1 INJECTION, SOLUTION INTRAVENOUS EVERY 24 HOURS
Status: DISCONTINUED | OUTPATIENT
Start: 2021-09-02 | End: 2021-09-03 | Stop reason: HOSPADM

## 2021-09-02 RX ADMIN — PRAVASTATIN SODIUM 40 MG: 20 TABLET ORAL at 09:10

## 2021-09-02 RX ADMIN — METFORMIN HYDROCHLORIDE 1000 MG: 500 TABLET ORAL at 09:10

## 2021-09-02 RX ADMIN — SODIUM CHLORIDE, PRESERVATIVE FREE 10 ML: 5 INJECTION INTRAVENOUS at 09:10

## 2021-09-02 RX ADMIN — GLIPIZIDE 5 MG: 5 TABLET ORAL at 09:10

## 2021-09-02 RX ADMIN — PANTOPRAZOLE SODIUM 40 MG: 40 TABLET, DELAYED RELEASE ORAL at 06:00

## 2021-09-02 RX ADMIN — INSULIN DETEMIR 20 UNITS: 100 INJECTION, SOLUTION SUBCUTANEOUS at 22:14

## 2021-09-02 RX ADMIN — ACETAMINOPHEN 650 MG: 325 TABLET, FILM COATED ORAL at 16:11

## 2021-09-02 RX ADMIN — ENOXAPARIN SODIUM 40 MG: 40 INJECTION SUBCUTANEOUS at 22:16

## 2021-09-02 RX ADMIN — SODIUM CHLORIDE, PRESERVATIVE FREE 10 ML: 5 INJECTION INTRAVENOUS at 20:28

## 2021-09-02 RX ADMIN — LISINOPRIL 10 MG: 10 TABLET ORAL at 09:10

## 2021-09-02 RX ADMIN — ONDANSETRON 4 MG: 2 INJECTION INTRAMUSCULAR; INTRAVENOUS at 20:27

## 2021-09-02 RX ADMIN — CEFTRIAXONE 1 G: 1 INJECTION, SOLUTION INTRAVENOUS at 09:10

## 2021-09-02 RX ADMIN — ACETAMINOPHEN 650 MG: 325 TABLET, FILM COATED ORAL at 09:10

## 2021-09-02 RX ADMIN — ACETAMINOPHEN 650 MG: 325 TABLET, FILM COATED ORAL at 03:19

## 2021-09-02 RX ADMIN — METFORMIN HYDROCHLORIDE 1000 MG: 500 TABLET ORAL at 18:18

## 2021-09-02 RX ADMIN — VANCOMYCIN HYDROCHLORIDE 1250 MG: 500 INJECTION, POWDER, LYOPHILIZED, FOR SOLUTION INTRAVENOUS at 01:58

## 2021-09-02 NOTE — PROGRESS NOTES
Assessment/Plan  Assessment/Plan     Postop day 2 status post incision and drainage of right foot.  Wound evaluated today.  Due to purulence expressed from the wound, I did not secure the retention sutures.  Foot cleaned with saline.  New iodoform packing placed in wound, covered with Adaptic, gauze soaked with Betadine, Kerlix, Ace bandage.  Recommend she remain inpatient at least for 1 more day to continue receiving IV antibiotics.  If there is no purulence in the wound tomorrow, I will secure the sutures.  I will also place an outpatient wound care consult for her to follow-up with me there.    Intra-Op culture still pending.  Preliminary cultures growing MSSA.  Noted change to Rocephin.  I also updated weightbearing orders to reflect heel weightbearing only while wearing postop shoe.      Ander Andrade DPM  09/02/21  07:52 EDT    Please call or text with questions. [phone]      -----------------------------------------------------------------------------------------------------------------------------  Chief complaint right foot cellulitis    Subjective     History of Present Illness:  Patient is a 40 y.o. female seen postop day 2 status post incision and drainage of right foot wound.  Patient relates no acute events overnight.  States that the block is wearing off and she is starting to get some feeling and movement back in her right foot, does not have any pain yet.  Denies nausea, vomiting, fever, chills, but states she is a little cold.  Denies chest pain.  Denies calf pain.    Family History   Problem Relation Age of Onset   • Diabetes Mother    • Cancer Mother    • Diabetes Father    • Heart disease Father    • Hypertension Father    • Diabetes Maternal Grandfather    • Diabetes Paternal Grandmother      Social History     Socioeconomic History   • Marital status:      Spouse name: Not on file   • Number of children: Not on file   • Years of education: Not on file   • Highest education  level: Not on file   Tobacco Use   • Smoking status: Current Every Day Smoker     Packs/day: 0.50     Years: 12.00     Pack years: 6.00   • Smokeless tobacco: Never Used   Vaping Use   • Vaping Use: Never used   Substance and Sexual Activity   • Alcohol use: No   • Drug use: Never   • Sexual activity: Yes     Partners: Male     Birth control/protection: I.U.D.     Comment: Mirena x 11 years      Past Medical History:   Diagnosis Date   • Arthritis    • Depression    • Diabetes (CMS/HCC)    • GERD (gastroesophageal reflux disease)    • Hyperlipidemia    • ABBIE (obstructive sleep apnea)      Past Surgical History:   Procedure Laterality Date   •  SECTION     • EYE SURGERY     • FOOT SURGERY     • INCISION AND DRAINAGE LEG Right 2021    Procedure: INCISION AND DRAINAGE WOUND RIGHT FOOT;  Surgeon: Ander Andrade DPM;  Location: [facility];  Service: Podiatry;  Laterality: Right;   • REFRACTIVE SURGERY       Medications Prior to Admission   Medication Sig Dispense Refill Last Dose   • glipizide (GLUCOTROL XL) 2.5 MG 24 hr tablet Take 2.5 mg by mouth Daily.      • insulin glargine (LANTUS, SEMGLEE) 100 UNIT/ML injection Inject 50 Units under the skin into the appropriate area as directed Every Night.      • insulin lispro (humaLOG) 100 UNIT/ML injection Inject 10 Units under the skin into the appropriate area as directed 3 (Three) Times a Day.      • metFORMIN (GLUCOPHAGE) 1000 MG tablet Take 1,000 mg by mouth 2 (two) times a day with meals.      • omeprazole (priLOSEC) 20 MG capsule Take 20 mg by mouth Daily.      • pravastatin (PRAVACHOL) 40 MG tablet Take 40 mg by mouth Daily.        Sitagliptin, Clindamycin, and Clindamycin/lincomycin    Objective   Physical Exam:     Mental Status: AAOx3 and in NAD   Heart:  RRR   Lungs: Respirations nonlabored on room air   Abdomen: Nondistended   Other findings noted:      LE exam  Vascular: DP/PT pulses palpable right foot.  Capillary fill time less than  3 seconds to right hallux    Neurologic: Gross sensation intact to right foot    Dermatologic: Please see new photo of right foot today under media tab.  There is some purulent drainage from the plantar and medial incisions.  Erythema is decreased compared to prior.  The surgical margins remain coapted.  There is slight maceration to the wound base plantarly.    Musculoskeletal: Muscle strength 4/5 for plantar flexion, dorsiflexion, inversion, eversion, right.  She is able to actively dorsiflex and plantarflex toes today  Deformities: None  Tenderness to palpation: None

## 2021-09-02 NOTE — PROGRESS NOTES
"Hospitalist Team      Patient Care Team:  Chrissy Mishra as PCP - General (Nurse Practitioner)        Chief Complaint:  Follow-up MSSA Diabetic Foot Infection    Subjective    Doing well and post-op pain controlled.  She denies chest pain and dyspnea.  Tolerating PO.    Objective    Vital Signs  Temp:  [96.5 °F (35.8 °C)-98.4 °F (36.9 °C)] 98.1 °F (36.7 °C)  Heart Rate:  [65-96] 65  Resp:  [16] 16  BP: (132-171)/(81-94) 151/94  Oxygen Therapy  SpO2: 96 %  Pulse Oximetry Type: Intermittent  Device (Oxygen Therapy): room air  Flow (L/min): 2  ETCO2 (mmHg): 38 mmHg}    Flowsheet Rows      First Filed Value   Admission Height  165.1 cm (65\") Documented at 08/28/2021 1826   Admission Weight  95.3 kg (210 lb) Documented at 08/28/2021 1826          Physical Exam:    General: Appears stated age in no acute distress.  Lungs: Breath sounds are diminished throughout all fields.  Normal excursion.  CV: Regular rate and rhythm.  No murmur appreciated.  Radial pulses are 2+ and symmetric.  Abdomen: Obese, soft, and non-tender w/ active bowel sounds.  MSK: Right foot dressed.  No asymmetry of the BLE.  Neuro: CN II-XII grossly intact.  Psych: Normal affect.    Results Review:     I reviewed the patient's new clinical results.    Lab Results (last 24 hours)     Procedure Component Value Units Date/Time    Wound Culture - Wound, Foot, Right [[id number]]  (Abnormal) Collected: 08/31/21 1501    Specimen: Wound from Foot, Right Updated: 09/02/21 0834     Wound Culture Scant growth (1+) Staphylococcus aureus     Gram Stain No WBCs seen      No organisms seen    POC Glucose Once [[id number]]  (Normal) Collected: 09/02/21 0742    Specimen: Blood Updated: 09/02/21 0803     Glucose 91 mg/dL      Comment: Meter: [id number] : [id number] Syed Crawford NURSING ASSISTANT       Basic Metabolic Panel [[id number]]  (Abnormal) Collected: 09/02/21 0306    Specimen: Blood Updated: 09/02/21 0536     Glucose 98 mg/dL      BUN 27 mg/dL      Creatinine " 1.22 mg/dL      Sodium 135 mmol/L      Potassium 4.6 mmol/L      Chloride 102 mmol/L      CO2 23.8 mmol/L      Calcium 8.4 mg/dL      eGFR Non African Amer 49 mL/min/1.73      BUN/Creatinine Ratio 22.1     Anion Gap 9.2 mmol/L     Narrative:      GFR Normal >60  Chronic Kidney Disease <60  Kidney Failure <15      CBC & Differential [[id number]]  (Abnormal) Collected: 09/02/21 0306    Specimen: Blood Updated: 09/02/21 0517    Narrative:      The following orders were created for panel order CBC & Differential.  Procedure                               Abnormality         Status                     ---------                               -----------         ------                     CBC Auto Differential[[id number]]        Abnormal            Final result                 Please view results for these tests on the individual orders.    CBC Auto Differential [[id number]]  (Abnormal) Collected: 09/02/21 0306    Specimen: Blood Updated: 09/02/21 0517     WBC 13.67 10*3/mm3      RBC 3.31 10*6/mm3      Hemoglobin 9.7 g/dL      Hematocrit 31.1 %      MCV 94.0 fL      MCH 29.3 pg      MCHC 31.2 g/dL      RDW 13.6 %      RDW-SD 46.7 fl      MPV 10.8 fL      Platelets 314 10*3/mm3      Neutrophil % 62.5 %      Lymphocyte % 23.0 %      Monocyte % 10.6 %      Eosinophil % 1.8 %      Basophil % 0.5 %      Immature Grans % 1.6 %      Neutrophils, Absolute 8.54 10*3/mm3      Lymphocytes, Absolute 3.15 10*3/mm3      Monocytes, Absolute 1.45 10*3/mm3      Eosinophils, Absolute 0.24 10*3/mm3      Basophils, Absolute 0.07 10*3/mm3      Immature Grans, Absolute 0.22 10*3/mm3      nRBC 0.0 /100 WBC     Blood Culture - Blood, Arm, Left [[id number]] Collected: 08/28/21 1921    Specimen: Blood from Arm, Left Updated: 09/01/21 1945     Blood Culture No growth at 4 days    Blood Culture - Blood, Hand, Left [[id number]] Collected: 08/28/21 1921    Specimen: Blood from Hand, Left Updated: 09/01/21 1945     Blood Culture No growth at 4 days    POC  Glucose Once [[id number]]  (Abnormal) Collected: 09/01/21 1933    Specimen: Blood Updated: 09/01/21 1939     Glucose 196 mg/dL      Comment: Meter: [id number] : [id number] Jorge A GUILLEN       POC Glucose Once [[id number]]  (Abnormal) Collected: 09/01/21 1647    Specimen: Blood Updated: 09/01/21 1655     Glucose 184 mg/dL      Comment: Meter: [id number] : [id number] Nawaf Stephens RN             Imaging Results (Last 24 Hours)     ** No results found for the last 24 hours. **          Medication Review:   I have reviewed the patient's current medication list    Current Facility-Administered Medications:   •  acetaminophen (TYLENOL) tablet 650 mg, 650 mg, Oral, Q4H PRN, 650 mg at 09/02/21 0319 **OR** acetaminophen (TYLENOL) 160 MG/5ML solution 650 mg, 650 mg, Oral, Q4H PRN **OR** acetaminophen (TYLENOL) suppository 650 mg, 650 mg, Rectal, Q4H PRN, Ander Andrade DPM  •  cefTRIAXone (ROCEPHIN) IVPB 1 g/50ml dextrose (premix), 1 g, Intravenous, Q24H, Jermain Gill MD  •  dextrose (D50W) 25 g/ 50mL Intravenous Solution 25 g, 25 g, Intravenous, Q15 Min PRN, Ander Andrade DPM  •  dextrose (GLUTOSE) oral gel 15 g, 15 g, Oral, Q15 Min PRN, Ander Andrade DPM  •  enoxaparin (LOVENOX) syringe 40 mg, 40 mg, Subcutaneous, Q24H, Ander Andrade DPM, 40 mg at 09/01/21 2306  •  glipizide (GLUCOTROL) tablet 5 mg, 5 mg, Oral, BID Raymond CALIX Nathaniel T, DPM, 5 mg at 09/01/21 1804  •  glucagon (GLUCAGEN) injection 1 mg, 1 mg, Subcutaneous, Q15 Min PRN, Ander Andrade DPM  •  insulin aspart (novoLOG) injection 0-9 Units, 0-9 Units, Subcutaneous, TID Raymond CALIX Nathaniel T, DPM, 2 Units at 09/01/21 1804  •  insulin detemir (LEVEMIR) injection 20 Units, 20 Units, Subcutaneous, Nightly, Ander Andrade DPM, 20 Units at 09/01/21 2101  •  lactated ringers infusion, 100 mL/hr, Intravenous, Continuous, Emiliano hCapman CRNA, Last Rate: 100 mL/hr at 08/31/21 2317, 100 mL/hr at 08/31/21  2317  •  lisinopril (PRINIVIL,ZESTRIL) tablet 10 mg, 10 mg, Oral, Q24H, Cecille Quevedo MD, 10 mg at 09/01/21 0849  •  metFORMIN (GLUCOPHAGE) tablet 1,000 mg, 1,000 mg, Oral, BID With Meals, Ander Andrade DPM, 1,000 mg at 09/01/21 1804  •  ondansetron (ZOFRAN) tablet 4 mg, 4 mg, Oral, Q6H PRN **OR** ondansetron (ZOFRAN) injection 4 mg, 4 mg, Intravenous, Q6H PRN, Ander Andrade DPM  •  pantoprazole (PROTONIX) EC tablet 40 mg, 40 mg, Oral, QAM, Ander Andrade, DPM, 40 mg at 09/02/21 0600  •  Pharmacy Consult - Pharmacy to dose, , Does not apply, Continuous PRN, Cecille Quevedo MD  •  pravastatin (PRAVACHOL) tablet 40 mg, 40 mg, Oral, Daily, Ander Andrade DPM, 40 mg at 09/01/21 0849  •  sodium chloride 0.9 % flush 1-10 mL, 1-10 mL, Intravenous, PRN, Ander Andrade DPM  •  [COMPLETED] Insert peripheral IV, , , Once **AND** sodium chloride 0.9 % flush 10 mL, 10 mL, Intravenous, PRN, Ander Andrade DPM  •  sodium chloride 0.9 % flush 10 mL, 10 mL, Intravenous, Q12H, Ander Andrade DPM, 10 mL at 09/01/21 2104  •  sodium chloride 0.9 % infusion 40 mL, 40 mL, Intravenous, PRN, Ander Andrade DPANGELITO      Assessment/Plan     1.  MSSA Diabetic Foot Infection: Changed to Rocephin given ease of dosing and conversion to oral.  Dr. Andrade could not close due to continued drainage.    2.  Diabetes Mellitus, Type 2 in Obese: Bedsides at goal.  I'm not sure of the benefit of both Glipizide and Basal insulin at an A1c of 12%.  Will stop Glipizide.    3.  CKDII-III: Creatinine about baseline.    4.  Dyslipidemia: Given CKD, Pravachol may not be the best choice.  Defer to outpatient provider.    5.  Tobacco abuse: Patient has been educated.    Plan for disposition: Home when able.    Jermain Gill MD  09/02/21  09:11 EDT

## 2021-09-02 NOTE — PLAN OF CARE
Goal Outcome Evaluation:  Plan of Care Reviewed With: patient        Progress: improving  Outcome Summary: Dressing change done by Dr Andrade this am. Change IV antibiotics to Rocephin. Tylenol given for pain. Pt has surgical shoe at bedside and is encouraged to wear when OOB. Pt ambulating in room and getting self to bathroom. Pt instructed to only apply weight to heel. Doppler studies done today.

## 2021-09-02 NOTE — PLAN OF CARE
Goal Outcome Evaluation:  Plan of Care Reviewed With: patient           Outcome Summary: VS stable, r/a. Continue on Vancomycin IV. Continued numbness in right foot. N/T baseline from neuropathy. Tylenol given for back pain with relief this shift. Voiding per bedside commode.

## 2021-09-02 NOTE — CASE MANAGEMENT/SOCIAL WORK
Continued Stay Note  RAMIRO Montero     Patient Name: Rahel Mejias  MRN: [id number]  Today's Date: 9/2/2021    Admit Date: 8/28/2021    Discharge Plan     Row Name 09/02/21 1105       Plan    Plan Comments  Patient is in bed watching TV.  She is ready to go home but knows that the wound is not healed enough to close.  Plan remains to go home with sister's help should she bobbi IV antibiotics.  Will continue to follow        Discharge Codes    No documentation.             Alley Evans RN

## 2021-09-03 VITALS
HEART RATE: 66 BPM | SYSTOLIC BLOOD PRESSURE: 139 MMHG | WEIGHT: 207.5 LBS | OXYGEN SATURATION: 95 % | HEIGHT: 65 IN | BODY MASS INDEX: 34.57 KG/M2 | TEMPERATURE: 97.8 F | DIASTOLIC BLOOD PRESSURE: 72 MMHG | RESPIRATION RATE: 18 BRPM

## 2021-09-03 LAB
BACTERIA SPEC AEROBE CULT: ABNORMAL
BASOPHILS # BLD AUTO: 0.07 10*3/MM3 (ref 0–0.2)
BASOPHILS NFR BLD AUTO: 0.5 % (ref 0–1.5)
DEPRECATED RDW RBC AUTO: 46.4 FL (ref 37–54)
EOSINOPHIL # BLD AUTO: 0.2 10*3/MM3 (ref 0–0.4)
EOSINOPHIL NFR BLD AUTO: 1.5 % (ref 0.3–6.2)
ERYTHROCYTE [DISTWIDTH] IN BLOOD BY AUTOMATED COUNT: 13.6 % (ref 12.3–15.4)
GLUCOSE BLDC GLUCOMTR-MCNC: 116 MG/DL (ref 70–130)
GLUCOSE BLDC GLUCOMTR-MCNC: 132 MG/DL (ref 70–130)
GRAM STN SPEC: ABNORMAL
GRAM STN SPEC: ABNORMAL
HCT VFR BLD AUTO: 30.3 % (ref 34–46.6)
HGB BLD-MCNC: 9.6 G/DL (ref 12–15.9)
IMM GRANULOCYTES # BLD AUTO: 0.19 10*3/MM3 (ref 0–0.05)
IMM GRANULOCYTES NFR BLD AUTO: 1.4 % (ref 0–0.5)
LYMPHOCYTES # BLD AUTO: 2.47 10*3/MM3 (ref 0.7–3.1)
LYMPHOCYTES NFR BLD AUTO: 18 % (ref 19.6–45.3)
MCH RBC QN AUTO: 29.5 PG (ref 26.6–33)
MCHC RBC AUTO-ENTMCNC: 31.7 G/DL (ref 31.5–35.7)
MCV RBC AUTO: 93.2 FL (ref 79–97)
MONOCYTES # BLD AUTO: 1.43 10*3/MM3 (ref 0.1–0.9)
MONOCYTES NFR BLD AUTO: 10.4 % (ref 5–12)
NEUTROPHILS NFR BLD AUTO: 68.2 % (ref 42.7–76)
NEUTROPHILS NFR BLD AUTO: 9.39 10*3/MM3 (ref 1.7–7)
NRBC BLD AUTO-RTO: 0 /100 WBC (ref 0–0.2)
PLATELET # BLD AUTO: 318 10*3/MM3 (ref 140–450)
PMV BLD AUTO: 10.6 FL (ref 6–12)
RBC # BLD AUTO: 3.25 10*6/MM3 (ref 3.77–5.28)
WBC # BLD AUTO: 13.75 10*3/MM3 (ref 3.4–10.8)

## 2021-09-03 PROCEDURE — 25010000002 CEFTRIAXONE SODIUM-DEXTROSE 1-3.74 GM-%(50ML) RECONSTITUTED SOLUTION: Performed by: HOSPITALIST

## 2021-09-03 PROCEDURE — 99238 HOSP IP/OBS DSCHRG MGMT 30/<: CPT | Performed by: HOSPITALIST

## 2021-09-03 PROCEDURE — 25010000002 ONDANSETRON PER 1 MG: Performed by: STUDENT IN AN ORGANIZED HEALTH CARE EDUCATION/TRAINING PROGRAM

## 2021-09-03 PROCEDURE — 85025 COMPLETE CBC W/AUTO DIFF WBC: CPT | Performed by: HOSPITALIST

## 2021-09-03 PROCEDURE — 82962 GLUCOSE BLOOD TEST: CPT

## 2021-09-03 RX ORDER — LISINOPRIL 20 MG/1
20 TABLET ORAL
Qty: 30 TABLET | Refills: 0 | Status: SHIPPED | OUTPATIENT
Start: 2021-09-04

## 2021-09-03 RX ORDER — CEFDINIR 300 MG/1
300 CAPSULE ORAL 2 TIMES DAILY
Qty: 28 CAPSULE | Refills: 0 | Status: SHIPPED | OUTPATIENT
Start: 2021-09-03 | End: 2021-09-17

## 2021-09-03 RX ADMIN — LISINOPRIL 10 MG: 10 TABLET ORAL at 08:03

## 2021-09-03 RX ADMIN — ACETAMINOPHEN 650 MG: 325 TABLET, FILM COATED ORAL at 08:22

## 2021-09-03 RX ADMIN — METFORMIN HYDROCHLORIDE 1000 MG: 500 TABLET ORAL at 08:03

## 2021-09-03 RX ADMIN — PANTOPRAZOLE SODIUM 40 MG: 40 TABLET, DELAYED RELEASE ORAL at 06:13

## 2021-09-03 RX ADMIN — CEFTRIAXONE 1 G: 1 INJECTION, SOLUTION INTRAVENOUS at 08:03

## 2021-09-03 RX ADMIN — SODIUM CHLORIDE, PRESERVATIVE FREE 10 ML: 5 INJECTION INTRAVENOUS at 08:04

## 2021-09-03 RX ADMIN — PRAVASTATIN SODIUM 40 MG: 20 TABLET ORAL at 08:03

## 2021-09-03 RX ADMIN — ONDANSETRON 4 MG: 2 INJECTION INTRAMUSCULAR; INTRAVENOUS at 08:51

## 2021-09-03 NOTE — PLAN OF CARE
Goal Outcome Evaluation:  Plan of Care Reviewed With: patient           Outcome Summary: Pt had an emesis last evening after having warm prune juice on the day shift. Zofran given IV. r/a. Dressing to foot d/i.  Up to bathroom independently. voiding. Pt reports bm yest.

## 2021-09-03 NOTE — PROGRESS NOTES
Assessment/Plan  Assessment/Plan     40-year-old female 3 days status post incision and drainage of right foot for cellulitis.    No purulence expressed from the wound today.  I tied the sutures which I placed during surgery for apposition of wound tissue.  I do not believe this will result in complete closure of the wound and she will need wound care following discharge.  She voiced understanding and already has an appointment in the wound care center next Friday to see me.  The wound was dressed today and this dressing may be left clean, dry, intact until she sees me next in the office, unless the dressing becomes loose or soiled.  As needed dressing change orders placed. I recommend 14 days antibiotics at least due to persistent erythema.  Intra-Op cultures growing staph aureus, the same as preliminary cultures.  Okay for discharge from podiatry standpoint.      Ander Andrade DPM  09/03/21  07:55 EDT    Please call or text with questions. [phone]      -----------------------------------------------------------------------------------------------------------------------------  Chief complaint right foot pain and cellulitis    Subjective     History of Present Illness:  Patient is a 40 y.o. female seen 3 days status post right foot incision and drainage for cellulitis.  Patient relates some nausea and vomiting overnight.  Tributes it to drinking warm prune juice for constipation.  Denies fever,  denies chills.  Hoping she can go home soon.  Is beginning to have more feeling in her foot.  She states pain she has is controlled with Tylenol.      Family History   Problem Relation Age of Onset   • Diabetes Mother    • Cancer Mother    • Diabetes Father    • Heart disease Father    • Hypertension Father    • Diabetes Maternal Grandfather    • Diabetes Paternal Grandmother      Social History     Socioeconomic History   • Marital status:      Spouse name: Not on file   • Number of children: Not on file    • Years of education: Not on file   • Highest education level: Not on file   Tobacco Use   • Smoking status: Current Every Day Smoker     Packs/day: 0.50     Years: 12.00     Pack years: 6.00   • Smokeless tobacco: Never Used   Vaping Use   • Vaping Use: Never used   Substance and Sexual Activity   • Alcohol use: No   • Drug use: Never   • Sexual activity: Yes     Partners: Male     Birth control/protection: I.U.D.     Comment: Mirena x 11 years      Past Medical History:   Diagnosis Date   • Arthritis    • Depression    • Diabetes (CMS/HCC)    • GERD (gastroesophageal reflux disease)    • Hyperlipidemia    • ABBIE (obstructive sleep apnea)      Past Surgical History:   Procedure Laterality Date   •  SECTION     • EYE SURGERY     • FOOT SURGERY  2018   • INCISION AND DRAINAGE LEG Right 2021    Procedure: INCISION AND DRAINAGE WOUND RIGHT FOOT;  Surgeon: Ander Andrade DPM;  Location: [facility];  Service: Podiatry;  Laterality: Right;   • REFRACTIVE SURGERY  2018     Medications Prior to Admission   Medication Sig Dispense Refill Last Dose   • glipizide (GLUCOTROL XL) 2.5 MG 24 hr tablet Take 2.5 mg by mouth Daily.      • insulin glargine (LANTUS, SEMGLEE) 100 UNIT/ML injection Inject 50 Units under the skin into the appropriate area as directed Every Night.      • insulin lispro (humaLOG) 100 UNIT/ML injection Inject 10 Units under the skin into the appropriate area as directed 3 (Three) Times a Day.      • metFORMIN (GLUCOPHAGE) 1000 MG tablet Take 1,000 mg by mouth 2 (two) times a day with meals.      • omeprazole (priLOSEC) 20 MG capsule Take 20 mg by mouth Daily.      • pravastatin (PRAVACHOL) 40 MG tablet Take 40 mg by mouth Daily.        Sitagliptin, Clindamycin, and Clindamycin/lincomycin    Objective     Review of Systems/Physical Exam:    Mental Status: AAOx3 and in NAD   Heart:  RRR   Lungs: Respirations nonlabored on room air   Abdomen: Nondistended   Other findings noted: None    LE  exam  Vascular: DP/PT pulses palpable right. CFT brisk to right hallux    Neurologic: Light touch sensation intact right    Dermatologic: See photo from today under media tab.  There is erythema to right first MPJ.  Slight maceration to surgical incision edges plantar and medially.  No purulence noted today on packing or expressed with pressure.    Musculoskeletal: Muscle strength 5/5 for plantar flexion, dorsiflexion, inversion, eversion, right  Deformities: none  Tenderness to palpation: Right foot wound

## 2021-09-03 NOTE — CASE MANAGEMENT/SOCIAL WORK
Continued Stay Note  RAMIRO Montero     Patient Name: Rahel Mejias  MRN: [id number]  Today's Date: 9/3/2021    Admit Date: 8/28/2021    Discharge Plan     Row Name 09/03/21 1054       Plan    Plan  plan home with     Plan Comments  Noted dc order- spoke with Sadie CROFT to clarify if patient will dc home on IV vs po abx. She states patient has been switched to po abx. Noted planned follow up with Dr Andrade in outpatient [facility]ound [facility]. No additional needs noted.        Discharge Codes    No documentation.       Expected Discharge Date and Time     Expected Discharge Date Expected Discharge Time    Sep 3, 2021             Noman Hartman RN

## 2021-09-03 NOTE — DISCHARGE INSTR - APPOINTMENTS
Follow up with Dr. Andrade podiatry as scheduled    Follow up with Chrissy Mishra on September 10th at 10:30

## 2021-09-03 NOTE — DISCHARGE SUMMARY
Rahel Mejias  1980  [id number]    Hospitalists Discharge Summary    Date of Admission: 8/28/2021  Date of Discharge:  9/3/2021    Primary Discharge Diagnoses:  1.  MSSA Diabetic Right Foot Infection  2.  Uncontrolled Diabetes Mellitus, Type 2 in Obese A1c of 12%  3.  Obesity Body mass index is 34.53 kg/m².    Secondary Discharge Diagnoses:  1.  CKDII-III  2.  Dyslipidemia  3.  Tobacco Abuse    History of Present Illness (taken from H&P):  40-year-old white female poorly controlled diabetic and not taken any of her medications for over a year he noticed some pain in and swelling in the bottom of the right foot over the head of her first metatarsal but a week ago she went to the emergency room on Tuesday at [facility] emergency room patient was I indeed and patient was started on Cipro Flagyl and Bactrim.  Patient says pain swelling and redness is progressively gotten worse now is involved the dorsum of her right foot.  She denies any drainage from the area no fever chills.  Patient been increasing difficulty bearing weight on her right foot    Hospital Course:  Ms. Mejias was admitted to the Med/Surg unit and continued on IV abx therapy.  She was seen in consultation by Podiatry and taken to the OR for definitive care.  Although the MRI suggested early osteomyelitis, Dr. Andrade did not feel this existed upon direct examination.  Culture data revealed MSSA and abx therapy was narrowed.  I also stopped her Glipizide given her A1c and little synergy in conjunction w/ insulin therapy.  Consideration should also be given to changing her statin given decreased renal clearance of Pravachol.    PCP  Patient Care Team:  Chrissy Mishra as PCP - General (Nurse Practitioner)    Consults:   Consults     Date and Time Order Name Status Description    8/28/2021 11:16 PM Inpatient Podiatry Consult            Operations and Procedures Performed:  Procedure(s):  INCISION AND DRAINAGE WOUND RIGHT FOOT     XR Foot 3+ View  Right    Result Date: 8/31/2021  Narrative: CR Foot Comp Min 3 Vws RT INDICATION: Foot pain and swelling. Patient is status post incision and drainage of right foot adjacent to the head of the first metatarsal. History of type 2 diabetes. COMPARISON: Right foot 8/28/2021, right foot MRI 8/30/2021. FINDINGS: AP, lateral and oblique views views of the right foot.  No fracture or dislocation.  Soft tissue swelling in the forefoot.  No fracture. Mottled lucency of the sesamoid bone along the fibular aspect adjacent to the head of the first metatarsal persists. First metatarsal appears to be intact.     Impression: No fracture. Mottled lucency of the sesamoid bone adjacent to the head of the first metatarsal along the fibular aspect is similar to prior study. First metatarsal normal. Signer Name: Mee Ramirez MD  Signed: 8/31/2021 3:49 PM  Workstation Name: [id number]  [facility]    XR Foot 3+ View Right    Result Date: 8/28/2021  Narrative: CR Foot Comp Min 3 Vws RT INDICATION: Right leg pain for 6 days. Diabetic foot infection. COMPARISON: None available FINDINGS: 3 views of the right foot.  No fracture or dislocation.  No bone erosion or destruction. Moderate soft tissue swelling throughout the foot, particularly along the plantar aspect of the forefoot. No soft tissue gas. No foreign body.     Impression: Moderate soft tissue swelling throughout the foot, particularly along the plantar aspect of the forefoot. No soft tissue gas. No osseous destructive changes. Signer Name: Jonathan Castle MD  Signed: 8/28/2021 7:59 PM  Workstation Name: [id number]  [facility]    MRI Foot Right With & Without Contrast    Result Date: 8/30/2021  Narrative: MRI Foot RT WO W INDICATION:  40-year-old female with wound on the bottom of the foot near the distal second metatarsal. Soft tissue infection. Evaluate for osteomyelitis. TECHNIQUE: MRI of the right foot with and without 18 cc  of MultiHance IV contrast. COMPARISON:  X-rays of the right foot dated 8/28/2021. FINDINGS: Osseous structures: There is some ill-defined marrow edema and enhancement in the medial and tibial sesamoids bilaterally. In the setting of an overlying soft tissue infection, the appearance is suspicious for early osteomyelitis, although there is No cortical destruction.  There is also mild marrow edema in the medial aspect of the head of the first metatarsal. The remaining osseous structures appear normal in morphology and signal intensitythe. The marker where the patient indicated the area of concern is seen along the plantar aspect of the foot at the level of the heads of the second and third metatarsals, and these osseous structures are unremarkable. The metatarsophalangeal joints appear relatively well preserved. There is a small first metatarsophalangeal joint effusion, and there is mild enhancement of the fluid following contrast administration. Soft tissues: There is diffuse edema and enhancement in the plantar and medial soft tissues at the level of the distal first metatarsal and first metatarsophalangeal joint, which could reflect an infectious or inflammatory process involving the soft tissues. A discrete mass or fluid collection such as an abscess is not identified. There is also diffuse dorsal subcutaneous edema in the imaged portion of the foot. There is diffuse edema or osseous muscles, and in the imaged portions of the distal flexor digitorum brevis muscle and abductor hallucis muscle. Ligaments and tendons: The imaged portions of the flexor and extensor tendons appear intact. The imaged portion of the plantar fascia appears intact. The Lisfranc ligament appears intact.     Impression: 1. Mild ill-defined edema, and enhancement in the sesamoid bones.  In the setting of an overlying soft tissue infection, the appearance is suspicious for early osteomyelitis although there are no definite bony destructive  changes. Mild ill-defined marrow edema is also seen in the medial aspect of the head of the first metatarsal. 2. The remaining osseous structures including the second and third metatarsals where the patient indicated the area of concern appears normal in morphology and signal intensity. 3. Extensive edema and mild enhancement in the plantar and medial soft tissues at the level of the distal first metatarsal and first metatarsophalangeal joint, which could reflect infection or inflammation. There is no discrete mass or fluid collection such as an abscess. 4. Edema in the interosseous muscles and plantar muscles as described above. Diffuse dorsal subcutaneous edema is noted. Signer Name: Fang Gee MD  Signed: 8/30/2021 4:27 PM  Workstation Name: [facility]  [facility] Ankle / Brachial Indices Extremity Complete    Result Date: 9/2/2021  Narrative: RESTING ANKLE-BRACHIAL INDEX MEASUREMENT, 09/02/2021  HISTORY: Bilateral claudication, smoking history, right foot when 3 2 weeks  TECHNIQUE: Cuff pressure measurements were obtained at brachial, ankle and great toe levels, including separate posterior tibial and dorsalis pedis measurements at each ankle. Pulse volume recordings and segmental Doppler waveforms were also obtained at ankle and toe levels.  FINDINGS: Measured ankle pressures are within normal limits at each location. Resting ankle brachial indices are as follows: *  Dorsalis pedis LOUISA: Right 1.03, left 1.03 *  Posterior tibial LOUISA: Right 1.09, left 1.10. Great toe pressures, pulse volume recordings and Doppler waveforms are within normal limits. Right and left brachial pressures are equivalent. Right TBI is 1.27 left LOUISA is 1.20    Impression: 1. No evidence of significant lower extremity arterial occlusive disease. 2. Ankle brachial indices measure up to 1.09 on the right and 1.10 on the left. .  This report was finalized on 9/2/2021 11:26 AM by Dr. Bryn Torre MD.       Peripheral Block    Result Date: 8/31/2021  Narrative: Shahana Ibarra CRNA     8/31/2021  2:08 PM Peripheral Block Patient reassessed immediately prior to procedure Patient location during procedure: pre-op Start time: 8/31/2021 1:58 PM Stop time: 8/31/2021 2:00 PM Reason for block: at surgeon's request and post-op pain management Performed by CRNA: Shahana Ibarra CRNA Preanesthetic Checklist Completed: patient identified, IV checked, site marked, risks and benefits discussed, surgical consent, monitors and equipment checked, pre-op evaluation and timeout performed Prep: Pt Position: supine Sterile barriers:cap, gloves, mask and washed/disinfected hands Prep: ChloraPrep Patient monitoring: blood pressure monitoring, continuous pulse oximetry and EKG Procedure Sedation:yes Performed under: local infiltration Guidance:ultrasound guided ULTRASOUND INTERPRETATION. Using ultrasound guidance a 21 G gauge needle was placed in close proximity to the nerve, at which point, under ultrasound guidance anesthetic was injected in the area of the nerve and spread of the anesthesia was seen on ultrasound in close proximity thereto.  There were no abnormalities seen on ultrasound; a digital image was taken; and the patient tolerated the procedure with no complications. Laterality:right Block Type:popliteal Injection Technique:single-shot Needle Type:echogenic Needle Gauge:21 G Resistance on Injection: none Medications Used: ropivacaine (NAROPIN) injection 0.5 %, 15 mL Med admintered at 8/31/2021 2:00 PM Post Assessment Injection Assessment: negative aspiration for heme, no paresthesia on injection and incremental injection Patient Tolerance:comfortable throughout block Complications:no     Peripheral Block    Result Date: 8/31/2021  Narrative: Shahana Ibarra CRNA     8/31/2021  2:07 PM Peripheral Block Patient reassessed immediately prior to procedure Patient location during procedure: pre-op Start time:  8/31/2021 1:52 PM Stop time: 8/31/2021 1:56 PM Reason for block: at surgeon's request and post-op pain management Performed by CRNA: Shahana Ibarra CRNA Preanesthetic Checklist Completed: patient identified, IV checked, site marked, risks and benefits discussed, surgical consent, monitors and equipment checked, pre-op evaluation and timeout performed Prep: Pt Position: supine Sterile barriers:cap, gloves, mask and washed/disinfected hands Prep: ChloraPrep Patient monitoring: blood pressure monitoring, continuous pulse oximetry and EKG Procedure Sedation:yes Performed under: local infiltration Guidance:ultrasound guided ULTRASOUND INTERPRETATION. Using ultrasound guidance a 21 G gauge needle was placed in close proximity to the nerve, at which point, under ultrasound guidance anesthetic was injected in the area of the nerve and spread of the anesthesia was seen on ultrasound in close proximity thereto.  There were no abnormalities seen on ultrasound; a digital image was taken; and the patient tolerated the procedure with no complications. Images:still images not obtained Laterality:right Block Type:adductor canal block Injection Technique:single-shot Needle Type:echogenic Needle Gauge:21 G Resistance on Injection: none Medications Used: ropivacaine (NAROPIN) injection 0.5 %, 15 mL Med admintered at 8/31/2021 1:56 PM Post Assessment Injection Assessment: negative aspiration for heme, no paresthesia on injection and incremental injection Patient Tolerance:comfortable throughout block Complications:no     US Venous Doppler Lower Extremity Right (duplex)    Result Date: 8/30/2021  Narrative: VENOUS DOPPLER ULTRASOUND, RIGHT LOWER EXTREMITY, 08/30/2021  HISTORY: 40-year-old female hospital inpatient with diabetic foot infection. Lower leg edema/swelling.  TECHNIQUE: Venous Doppler ultrasound examination of the right leg was performed using grey-scale, spectral Doppler, and color flow Doppler ultrasound imaging.   FINDINGS: The examination is negative.  There is no evidence of deep venous thrombosis from the groin to the lower calf. The greater saphenous vein is also patent.      Impression: Negative examination.  No evidence of right lower extremity DVT.  This report was finalized on 8/30/2021 2:38 PM by Dr. Jorge A Persaud MD.      US Sonosite Portable    Result Date: 8/31/2021  Narrative: This procedure was auto-finalized with no dictation required.      Allergies:  is allergic to sitagliptin, clindamycin, and clindamycin/lincomycin.    Martin  not reviewed    Discharge Medications:     Discharge Medications      New Medications      Instructions Start Date   cefdinir 300 MG capsule  Commonly known as: OMNICEF   300 mg, Oral, 2 Times Daily      lisinopril 20 MG tablet  Commonly known as: PRINIVIL,ZESTRIL   20 mg, Oral, Every 24 Hours Scheduled   Start Date: September 4, 2021        Continue These Medications      Instructions Start Date   insulin glargine 100 UNIT/ML injection  Commonly known as: LANTUS, SEMGLEE   50 Units, Subcutaneous, Nightly      insulin lispro 100 UNIT/ML injection  Commonly known as: humaLOG   10 Units, Subcutaneous, 3 Times Daily      metFORMIN 1000 MG tablet  Commonly known as: GLUCOPHAGE   1,000 mg, Oral, 2 Times Daily With Meals      omeprazole 20 MG capsule  Commonly known as: priLOSEC   20 mg, Oral, Daily      pravastatin 40 MG tablet  Commonly known as: PRAVACHOL   40 mg, Oral, Daily         Stop These Medications    glipizide 2.5 MG 24 hr tablet  Commonly known as: GLUCOTROL XL            Last Lab Results:   Lab Results (most recent)     Procedure Component Value Units Date/Time    Wound Culture - Wound, Foot, Right [[id number]]  (Abnormal)  (Susceptibility) Collected: 08/31/21 1501    Specimen: Wound from Foot, Right Updated: 09/03/21 0901     Wound Culture Scant growth (1+) Staphylococcus aureus     Gram Stain No WBCs seen      No organisms seen    Susceptibility      Staphylococcus aureus       AILYN      Clindamycin Susceptible      Erythromycin Susceptible      Inducible Clindamycin Resistance Negative      Oxacillin Susceptible      Penicillin G Resistant      Rifampin Susceptible      Tetracycline Susceptible      Trimethoprim + Sulfamethoxazole Susceptible      Vancomycin Susceptible               Linear View               Susceptibility Comments     Staphylococcus aureus    This isolate does not demonstrate inducible clindamycin resistance in vitro.    This isolate does not demonstrate inducible clindamycin resistance in vitro.               POC Glucose Once [[id number]]  (Normal) Collected: 09/03/21 0726    Specimen: Blood Updated: 09/03/21 0739     Glucose 116 mg/dL      Comment: Meter: [id number] : [id number] [facility] NURSING ASSISTANT       CBC & Differential [[id number]]  (Abnormal) Collected: 09/03/21 0303    Specimen: Blood Updated: 09/03/21 0452    Narrative:      The following orders were created for panel order CBC & Differential.  Procedure                               Abnormality         Status                     ---------                               -----------         ------                     CBC Auto Differential[[id number]]        Abnormal            Final result                 Please view results for these tests on the individual orders.    CBC Auto Differential [[id number]]  (Abnormal) Collected: 09/03/21 0303    Specimen: Blood Updated: 09/03/21 0452     WBC 13.75 10*3/mm3      RBC 3.25 10*6/mm3      Hemoglobin 9.6 g/dL      Hematocrit 30.3 %      MCV 93.2 fL      MCH 29.5 pg      MCHC 31.7 g/dL      RDW 13.6 %      RDW-SD 46.4 fl      MPV 10.6 fL      Platelets 318 10*3/mm3      Neutrophil % 68.2 %      Lymphocyte % 18.0 %      Monocyte % 10.4 %      Eosinophil % 1.5 %      Basophil % 0.5 %      Immature Grans % 1.4 %      Neutrophils, Absolute 9.39 10*3/mm3      Lymphocytes, Absolute 2.47 10*3/mm3      Monocytes, Absolute 1.43 10*3/mm3      Eosinophils, Absolute 0.20  10*3/mm3      Basophils, Absolute 0.07 10*3/mm3      Immature Grans, Absolute 0.19 10*3/mm3      nRBC 0.0 /100 WBC     POC Glucose Once [[id number]]  (Abnormal) Collected: 09/02/21 2205    Specimen: Blood Updated: 09/02/21 2212     Glucose 142 mg/dL      Comment: Meter: [id number] : [id number] Molina Sabinoraheem YOUNG       Blood Culture - Blood, Arm, Left [[id number]] Collected: 08/28/21 1921    Specimen: Blood from Arm, Left Updated: 09/02/21 1945     Blood Culture No growth at 5 days    Blood Culture - Blood, Hand, Left [[id number]] Collected: 08/28/21 1921    Specimen: Blood from Hand, Left Updated: 09/02/21 1945     Blood Culture No growth at 5 days    Basic Metabolic Panel [[id number]]  (Abnormal) Collected: 09/02/21 0306    Specimen: Blood Updated: 09/02/21 0536     Glucose 98 mg/dL      BUN 27 mg/dL      Creatinine 1.22 mg/dL      Sodium 135 mmol/L      Potassium 4.6 mmol/L      Chloride 102 mmol/L      CO2 23.8 mmol/L      Calcium 8.4 mg/dL      eGFR Non African Amer 49 mL/min/1.73      BUN/Creatinine Ratio 22.1     Anion Gap 9.2 mmol/L     Narrative:      GFR Normal >60  Chronic Kidney Disease <60  Kidney Failure <15      CBC & Differential [[id number]]  (Abnormal) Collected: 09/02/21 0306    Specimen: Blood Updated: 09/02/21 0517    Narrative:      The following orders were created for panel order CBC & Differential.  Procedure                               Abnormality         Status                     ---------                               -----------         ------                     CBC Auto Differential[[id number]]        Abnormal            Final result                 Please view results for these tests on the individual orders.    CBC Auto Differential [[id number]]  (Abnormal) Collected: 09/02/21 0306    Specimen: Blood Updated: 09/02/21 0517     WBC 13.67 10*3/mm3      RBC 3.31 10*6/mm3      Hemoglobin 9.7 g/dL      Hematocrit 31.1 %      MCV 94.0 fL      MCH 29.3 pg      MCHC 31.2 g/dL      RDW 13.6 %       RDW-SD 46.7 fl      MPV 10.8 fL      Platelets 314 10*3/mm3      Neutrophil % 62.5 %      Lymphocyte % 23.0 %      Monocyte % 10.6 %      Eosinophil % 1.8 %      Basophil % 0.5 %      Immature Grans % 1.6 %      Neutrophils, Absolute 8.54 10*3/mm3      Lymphocytes, Absolute 3.15 10*3/mm3      Monocytes, Absolute 1.45 10*3/mm3      Eosinophils, Absolute 0.24 10*3/mm3      Basophils, Absolute 0.07 10*3/mm3      Immature Grans, Absolute 0.22 10*3/mm3      nRBC 0.0 /100 WBC     Magnesium [[id number]]  (Normal) Collected: 09/01/21 0704    Specimen: Blood Updated: 09/01/21 0826     Magnesium 1.9 mg/dL     Basic Metabolic Panel [[id number]]  (Abnormal) Collected: 09/01/21 0704    Specimen: Blood Updated: 09/01/21 0739     Glucose 204 mg/dL      BUN 26 mg/dL      Creatinine 1.23 mg/dL      Sodium 134 mmol/L      Potassium 4.6 mmol/L      Chloride 102 mmol/L      CO2 23.3 mmol/L      Calcium 8.9 mg/dL      eGFR Non African Amer 48 mL/min/1.73      BUN/Creatinine Ratio 21.1     Anion Gap 8.7 mmol/L     Narrative:      GFR Normal >60  Chronic Kidney Disease <60  Kidney Failure <15      Vancomycin, Random [[id number]]  (Normal) Collected: 09/01/21 0704    Specimen: Blood Updated: 09/01/21 0738     Vancomycin Random 12.70 mcg/mL     Narrative:      Therapeutic Ranges for Vancomycin    Vancomycin Random   5.0-40.0 mcg/mL  Vancomycin Trough   5.0-20.0 mcg/mL  Vancomycin Peak     20.0-40.0 mcg/mL    Wound Culture - Drainage, Foot, Right [[id number]]  (Abnormal)  (Susceptibility) Collected: 08/28/21 1854    Specimen: Drainage from Foot, Right Updated: 08/31/21 0735     Wound Culture Heavy growth (4+) Staphylococcus aureus     Gram Stain Moderate (3+) WBCs per low power field      Moderate (3+) Gram negative bacilli      Few (2+) Gram positive cocci in pairs    Susceptibility      Staphylococcus aureus      AILYN      Clindamycin Susceptible      Erythromycin Susceptible      Inducible Clindamycin Resistance Negative      Oxacillin  Susceptible      Penicillin G Resistant      Rifampin Susceptible      Tetracycline Susceptible      Trimethoprim + Sulfamethoxazole Susceptible      Vancomycin Susceptible               Linear View               Susceptibility Comments     Staphylococcus aureus    This isolate does not demonstrate inducible clindamycin resistance in vitro.               C-reactive Protein [[id number]]  (Abnormal) Collected: 08/30/21 0350    Specimen: Blood Updated: 08/30/21 1059     C-Reactive Protein 7.57 mg/dL     Vancomycin, Random [[id number]]  (Normal) Collected: 08/30/21 0732    Specimen: Blood Updated: 08/30/21 0756     Vancomycin Random 22.50 mcg/mL     Narrative:      Therapeutic Ranges for Vancomycin    Vancomycin Random   5.0-40.0 mcg/mL  Vancomycin Trough   5.0-20.0 mcg/mL  Vancomycin Peak     20.0-40.0 mcg/mL    Renal Function Panel [[id number]]  (Abnormal) Collected: 08/30/21 0350    Specimen: Blood Updated: 08/30/21 0516     Glucose 129 mg/dL      BUN 24 mg/dL      Creatinine 1.38 mg/dL      Sodium 131 mmol/L      Potassium 3.8 mmol/L      Chloride 100 mmol/L      CO2 20.2 mmol/L      Calcium 7.8 mg/dL      Albumin 2.40 g/dL      Phosphorus 4.3 mg/dL      Anion Gap 10.8 mmol/L      BUN/Creatinine Ratio 17.4     eGFR Non African Amer 42 mL/min/1.73     Narrative:      GFR Normal >60  Chronic Kidney Disease <60  Kidney Failure <15      Lipid Panel [[id number]]  (Abnormal) Collected: 08/30/21 0350    Specimen: Blood Updated: 08/30/21 0516     Total Cholesterol 144 mg/dL      Triglycerides 280 mg/dL      HDL Cholesterol 29 mg/dL      LDL Cholesterol  70 mg/dL      VLDL Cholesterol 45 mg/dL      LDL/HDL Ratio 2.03    Narrative:      Cholesterol Reference Ranges  (U.S. Department of Health and Human Services ATP III Classifications)    Desirable          <200 mg/dL  Borderline High    200-239 mg/dL  High Risk          >240 mg/dL      Triglyceride Reference Ranges  (U.S. Department of Health and Human Services ATP III  Classifications)    Normal           <150 mg/dL  Borderline High  150-199 mg/dL  High             200-499 mg/dL  Very High        >500 mg/dL    HDL Reference Ranges  (U.S. Department of Health and Human Services ATP III Classifcations)    Low     <40 mg/dl (major risk factor for CHD)  High    >60 mg/dl ('negative' risk factor for CHD)        LDL Reference Ranges  (U.S. Department of Health and Human Services ATP III Classifcations)    Optimal          <100 mg/dL  Near Optimal     100-129 mg/dL  Borderline High  130-159 mg/dL  High             160-189 mg/dL  Very High        >189 mg/dL    Magnesium [[id number]]  (Abnormal) Collected: 08/30/21 0350    Specimen: Blood Updated: 08/30/21 0516     Magnesium 1.5 mg/dL     C-reactive Protein [[id number]]  (Abnormal) Collected: 08/29/21 0332    Specimen: Blood Updated: 08/29/21 1232     C-Reactive Protein 7.97 mg/dL     Hemoglobin A1c [[id number]]  (Abnormal) Collected: 08/28/21 2226    Specimen: Blood Updated: 08/28/21 2342     Hemoglobin A1C 12.10 %     Narrative:      Hemoglobin A1C Ranges:    Increased Risk for Diabetes  5.7% to 6.4%  Diabetes                     >= 6.5%  Diabetic Goal                < 7.0%    Sedimentation Rate [[id number]]  (Abnormal) Collected: 08/28/21 2226    Specimen: Blood Updated: 08/28/21 2335     Sed Rate 126 mm/hr     CBC (No Diff) [[id number]]  (Abnormal) Collected: 08/28/21 2226    Specimen: Blood Updated: 08/28/21 2258     WBC 13.85 10*3/mm3      RBC 4.09 10*6/mm3      Hemoglobin 12.0 g/dL      Hematocrit 36.1 %      MCV 88.3 fL      MCH 29.3 pg      MCHC 33.2 g/dL      RDW 13.5 %      RDW-SD 43.8 fl      MPV 10.6 fL      Platelets 316 10*3/mm3     COVID-19,[facility] IN-HOUSE,Nasal Swab No Transport Media 3-4 HR TAT - Swab, Nasal Cavity [[id number]]  (Normal) Collected: 08/28/21 1854    Specimen: Swab from Nasal Cavity Updated: 08/28/21 2011     COVID19 Not Detected    Narrative:      Fact sheet for providers:  https://www.fda.gov/media/504555/download     Fact sheet for patients: https://www.fda.gov/media/761386/download    Test performed by PCR.    Consider negative results in combination with clinical observations, patient history, and epidemiological information.    Comprehensive Metabolic Panel [[id number]]  (Abnormal) Collected: 08/28/21 1921    Specimen: Blood Updated: 08/28/21 2004     Glucose 386 mg/dL      BUN 17 mg/dL      Creatinine 0.91 mg/dL      Sodium 130 mmol/L      Potassium 4.4 mmol/L      Chloride 94 mmol/L      CO2 23.8 mmol/L      Calcium 9.2 mg/dL      Total Protein 6.7 g/dL      Albumin 3.20 g/dL      ALT (SGPT) 11 U/L      AST (SGOT) 13 U/L      Alkaline Phosphatase 122 U/L      Total Bilirubin 0.3 mg/dL      eGFR Non African Amer 68 mL/min/1.73      Globulin 3.5 gm/dL      A/G Ratio 0.9 g/dL      BUN/Creatinine Ratio 18.7     Anion Gap 12.2 mmol/L     Narrative:      GFR Normal >60  Chronic Kidney Disease <60  Kidney Failure <15      Procalcitonin [[id number]]  (Abnormal) Collected: 08/28/21 1921    Specimen: Blood Updated: 08/28/21 2002     Procalcitonin 0.46 ng/mL     Narrative:      Results may be falsely decreased if patient taking Biotin.     Lactic Acid, Plasma [[id number]]  (Normal) Collected: 08/28/21 1921    Specimen: Blood Updated: 08/28/21 1952     Lactate 1.1 mmol/L     aPTT [[id number]]  (Normal) Collected: 08/28/21 1921    Specimen: Blood Updated: 08/28/21 1947     PTT 33.1 seconds     Narrative:      PTT = The equivalent PTT values for the therapeutic range of heparin levels at 0.1 to 0.7 U/ml are 53 to 110 seconds.      Protime-INR [[id number]]  (Normal) Collected: 08/28/21 1921    Specimen: Blood Updated: 08/28/21 1947     Protime 12.8 Seconds      INR 0.96    Narrative:      Therapeutic Ranges for INR: 2.0-3.0 (PT 20-30)                              2.5-3.5 (PT 25-34)        Imaging Results (Most Recent)     Procedure Component Value Units Date/Time    US Ankle / Brachial Indices  Extremity Complete [[id number]] Collected: 09/02/21 1120     Updated: 09/02/21 1128    Narrative:      RESTING ANKLE-BRACHIAL INDEX MEASUREMENT, 09/02/2021     HISTORY:  Bilateral claudication, smoking history, right foot when 3 2 weeks     TECHNIQUE:  Cuff pressure measurements were obtained at brachial, ankle and great  toe levels, including separate posterior tibial and dorsalis pedis  measurements at each ankle. Pulse volume recordings and segmental  Doppler waveforms were also obtained at ankle and toe levels.     FINDINGS:  Measured ankle pressures are within normal limits at each location.  Resting ankle brachial indices are as follows:  *  Dorsalis pedis LOUISA: Right 1.03, left 1.03  *  Posterior tibial LOUISA: Right 1.09, left 1.10.  Great toe pressures, pulse volume recordings and Doppler waveforms are  within normal limits. Right and left brachial pressures are equivalent.  Right TBI is 1.27 left LOUISA is 1.20    Impression:      1. No evidence of significant lower extremity arterial occlusive  disease.  2. Ankle brachial indices measure up to 1.09 on the right and 1.10 on  the left.  .     This report was finalized on 9/2/2021 11:26 AM by Dr. Bryn Torre MD.       XR Foot 3+ View Right [[id number]] Collected: 08/31/21 1549     Updated: 08/31/21 1552    Narrative:      CR Foot Comp Min 3 Vws RT    INDICATION:   Foot pain and swelling. Patient is status post incision and drainage of right foot adjacent to the head of the first metatarsal. History of type 2 diabetes.    COMPARISON:   Right foot 8/28/2021, right foot MRI 8/30/2021.    FINDINGS:   AP, lateral and oblique views views of the right foot.  No fracture or dislocation.  Soft tissue swelling in the forefoot.  No fracture. Mottled lucency of the sesamoid bone along the fibular aspect adjacent to the head of the first metatarsal persists.  First metatarsal appears to be intact.      Impression:      No fracture. Mottled lucency of the sesamoid bone adjacent to  the head of the first metatarsal along the fibular aspect is similar to prior study. First metatarsal normal.    Signer Name: Mee Ramirez MD   Signed: 8/31/2021 3:49 PM   Workstation Name: [id number]    [facility] Son[employer] Portable [[id number]] Resulted: 08/31/21 1345     Updated: 08/31/21 1345    Narrative:      This procedure was auto-finalized with no dictation required.    SCANNED - IMAGING [[id number]] Resulted: 08/28/21     Updated: 08/31/21 0946    MRI Foot Right With & Without Contrast [[id number]] Collected: 08/30/21 1627     Updated: 08/30/21 1630    Narrative:      MRI Foot RT WO W    INDICATION:    40-year-old female with wound on the bottom of the foot near the distal second metatarsal. Soft tissue infection. Evaluate for osteomyelitis.    TECHNIQUE:   MRI of the right foot with and without 18 cc of MultiHance IV contrast.    COMPARISON:    X-rays of the right foot dated 8/28/2021.    FINDINGS:  Osseous structures: There is some ill-defined marrow edema and enhancement in the medial and tibial sesamoids bilaterally. In the setting of an overlying soft tissue infection, the appearance is suspicious for early osteomyelitis, although there is No  cortical destruction.  There is also mild marrow edema in the medial aspect of the head of the first metatarsal. The remaining osseous structures appear normal in morphology and signal intensitythe.    The marker where the patient indicated the area of concern is seen along the plantar aspect of the foot at the level of the heads of the second and third metatarsals, and these osseous structures are unremarkable.    The metatarsophalangeal joints appear relatively well preserved. There is a small first metatarsophalangeal joint effusion, and there is mild enhancement of the fluid following contrast administration.    Soft tissues: There is diffuse edema and enhancement in the plantar and medial soft tissues at the level of the  distal first metatarsal and first metatarsophalangeal joint, which could reflect an infectious or inflammatory process involving the soft  tissues. A discrete mass or fluid collection such as an abscess is not identified. There is also diffuse dorsal subcutaneous edema in the imaged portion of the foot. There is diffuse edema or osseous muscles, and in the imaged portions of the distal  flexor digitorum brevis muscle and abductor hallucis muscle.    Ligaments and tendons: The imaged portions of the flexor and extensor tendons appear intact. The imaged portion of the plantar fascia appears intact. The Lisfranc ligament appears intact.      Impression:        1. Mild ill-defined edema, and enhancement in the sesamoid bones.  In the setting of an overlying soft tissue infection, the appearance is suspicious for early osteomyelitis although there are no definite bony destructive changes. Mild ill-defined marrow  edema is also seen in the medial aspect of the head of the first metatarsal.  2. The remaining osseous structures including the second and third metatarsals where the patient indicated the area of concern appears normal in morphology and signal intensity.  3. Extensive edema and mild enhancement in the plantar and medial soft tissues at the level of the distal first metatarsal and first metatarsophalangeal joint, which could reflect infection or inflammation. There is no discrete mass or fluid collection  such as an abscess.  4. Edema in the interosseous muscles and plantar muscles as described above. Diffuse dorsal subcutaneous edema is noted.    Signer Name: Fang Gee MD   Signed: 8/30/2021 4:27 PM   Workstation Name: [id number]    [facility] Venous Doppler Lower Extremity Right (duplex) [[id number]] Collected: 08/30/21 1438     Updated: 08/30/21 1440    Narrative:      VENOUS DOPPLER ULTRASOUND, RIGHT LOWER EXTREMITY, 08/30/2021     HISTORY:   40-year-old female hospital  inpatient with diabetic foot infection.  Lower leg edema/swelling.     TECHNIQUE:   Venous Doppler ultrasound examination of the right leg was performed  using grey-scale, spectral Doppler, and color flow Doppler ultrasound  imaging.     FINDINGS:   The examination is negative.  There is no evidence of deep venous  thrombosis from the groin to the lower calf. The greater saphenous vein  is also patent.       Impression:      Negative examination.  No evidence of right lower extremity DVT.     This report was finalized on 8/30/2021 2:38 PM by Dr. Jorge A Persaud MD.       XR Foot 3+ View Right [[id number]] Collected: 08/28/21 1959     Updated: 08/28/21 2001    Narrative:      CR Foot Comp Min 3 Vws RT    INDICATION:   Right leg pain for 6 days. Diabetic foot infection.    COMPARISON:   None available    FINDINGS:   3 views of the right foot.  No fracture or dislocation.  No bone erosion or destruction. Moderate soft tissue swelling throughout the foot, particularly along the plantar aspect of the forefoot. No soft tissue gas. No foreign body.      Impression:      Moderate soft tissue swelling throughout the foot, particularly along the plantar aspect of the forefoot. No soft tissue gas. No osseous destructive changes.    Signer Name: Jonathan Castle MD   Signed: 8/28/2021 7:59 PM   Workstation Name: [id number]    [facility]          PROCEDURES  Procedure(s):  INCISION AND DRAINAGE WOUND RIGHT FOOT    Condition on Discharge:  Stable    Physical Exam at Discharge  Vital Signs  Temp:  [97.8 °F (36.6 °C)-98.6 °F (37 °C)] 98.6 °F (37 °C)  Heart Rate:  [89-97] 97  Resp:  [18] 18  BP: (147-156)/(85-93) 150/89    Physical Exam:  Physical Exam   Constitutional: Patient appears well-developed and well-nourished and in no acute distress.   Cardiovascular: Regular rate, regular rhythm, S1 normal and S2 normal.  Exam reveals no gallop and no friction rub.  No murmur heard.  Pulmonary/Chest: Lungs are  clear to auscultation bilaterally. No respiratory distress. No wheezes. No rhonchi. No rales.   Abdominal: Obese. Soft. Bowel sounds are normal. There is no tenderness.   Musculoskeletal: Normal Muscle tone  Extremities: No edema. No asymmetry.  Neurological: Cranial nerves II-XII are grossly intact with no focal deficits.  Skin: Right foot is dressed and neurovascularly intact.    Discharge Disposition  Home    Visiting Nurse:    No     Home PT/OT:  No     Home Safety Evaluation:  No     DME  None    Discharge Diet:      Dietary Orders (From admission, onward)     Start     Ordered    08/31/21 1517  Diet Regular; Cardiac, Consistent Carbohydrate  Diet Effective Now     Question Answer Comment   Diet Texture / Consistency Regular    Common Modifiers Cardiac    Common Modifiers Consistent Carbohydrate        08/31/21 1517                Activity at Discharge:  As per Podiatry    Pre-discharge education  Wound Care      Follow-up Appointments  No future appointments.  Additional Instructions for the Follow-ups that You Need to Schedule     Discharge Follow-up with PCP   As directed       Currently Documented PCP:    Chrissy Mishra    PCP Phone Number:    [phone]     Follow Up Details: Within 1 week.  Needs repeat renal panel.         Discharge Follow-up with Specified Provider: Dr. Andrade as scheduled   As directed      To: Dr. Andrade as scheduled               Test Results Pending at Discharge  None     Jermain Gill MD  09/03/21  09:58 EDT    Time: <30 minutes

## 2021-09-04 ENCOUNTER — READMISSION MANAGEMENT (OUTPATIENT)
Dept: CALL CENTER | Facility: HOSPITAL | Age: 41
End: 2021-09-04

## 2021-09-04 NOTE — CASE MANAGEMENT/SOCIAL WORK
Case Management Discharge Note      Final Note: D/C home    Provided Post Acute Provider List?: Refused  Refused Provider List Comment: declined  Provided Post Acute Provider Quality & Resource List?: Refused  Refused Quality and Resource List Comment: declined    Selected Continued Care - Discharged on 9/3/2021 Admission date: 8/28/2021 - Discharge disposition: Home or Self Care    Destination    No services have been selected for the patient.              Durable Medical Equipment    No services have been selected for the patient.              Dialysis/Infusion    No services have been selected for the patient.              Home Medical Care    No services have been selected for the patient.              Therapy    No services have been selected for the patient.              Community Resources    No services have been selected for the patient.              Community & DME    No services have been selected for the patient.                       Final Discharge Disposition Code: 01 - home or self-care

## 2021-09-04 NOTE — OUTREACH NOTE
Prep Survey      Responses   [facility] patient discharged from?  [place]   Is LACE score < 7 ?  No   Emergency Room discharge w/ pulse ox?  No   Eligibility  Readm Mgmt   Discharge diagnosis  diabetic foot infection with saph aureus,  I&D   Does the patient have one of the following disease processes/diagnoses(primary or secondary)?  General Surgery   Does the patient have Home health ordered?  No   Is there a DME ordered?  No   Comments regarding appointments  call for apmt   Prep survey completed?  Yes          Milly Molina RN

## 2021-09-09 ENCOUNTER — READMISSION MANAGEMENT (OUTPATIENT)
Dept: CALL CENTER | Facility: HOSPITAL | Age: 41
End: 2021-09-09

## 2021-09-09 NOTE — OUTREACH NOTE
General Surgery Week 1 Survey      Responses   [facility] patient discharged from?  [place]   Does the patient have one of the following disease processes/diagnoses(primary or secondary)?  General Surgery   Week 1 attempt successful?  No   Unsuccessful attempts  Attempt 1          Alley Ryan RN

## 2021-09-10 ENCOUNTER — APPOINTMENT (OUTPATIENT)
Dept: WOUND CARE | Facility: HOSPITAL | Age: 41
End: 2021-09-10

## 2021-09-13 ENCOUNTER — APPOINTMENT (OUTPATIENT)
Dept: WOUND CARE | Facility: HOSPITAL | Age: 41
End: 2021-09-13

## 2021-09-13 PROCEDURE — G0463 HOSPITAL OUTPT CLINIC VISIT: HCPCS

## 2021-09-14 ENCOUNTER — READMISSION MANAGEMENT (OUTPATIENT)
Dept: CALL CENTER | Facility: HOSPITAL | Age: 41
End: 2021-09-14

## 2021-09-14 NOTE — OUTREACH NOTE
General Surgery Week 1 Survey      Responses   [facility] patient discharged from?  [place]   Does the patient have one of the following disease processes/diagnoses(primary or secondary)?  General Surgery   Week 1 attempt successful?  Yes   Call start time  1409   Rescheduled  Rescheduled-pt requested   Call end time  1409   Person spoke with today (if not patient) and relationship  spouse, Sebastien Ryan RN

## 2021-09-16 ENCOUNTER — READMISSION MANAGEMENT (OUTPATIENT)
Dept: CALL CENTER | Facility: HOSPITAL | Age: 41
End: 2021-09-16

## 2021-09-16 NOTE — OUTREACH NOTE
General Surgery Week 1 Survey      Responses   [facility] patient discharged from?  [place]   Does the patient have one of the following disease processes/diagnoses(primary or secondary)?  General Surgery   Week 1 attempt successful?  No   Rescheduled  Revoked          Lynne Baires RN

## 2021-09-20 ENCOUNTER — APPOINTMENT (OUTPATIENT)
Dept: WOUND CARE | Facility: HOSPITAL | Age: 41
End: 2021-09-20

## 2021-09-27 ENCOUNTER — APPOINTMENT (OUTPATIENT)
Dept: WOUND CARE | Facility: HOSPITAL | Age: 41
End: 2021-09-27

## 2021-10-01 ENCOUNTER — APPOINTMENT (OUTPATIENT)
Dept: WOUND CARE | Facility: HOSPITAL | Age: 41
End: 2021-10-01

## 2021-10-07 ENCOUNTER — LAB REQUISITION (OUTPATIENT)
Dept: LAB | Facility: HOSPITAL | Age: 41
End: 2021-10-07

## 2021-10-07 ENCOUNTER — APPOINTMENT (OUTPATIENT)
Dept: WOUND CARE | Facility: HOSPITAL | Age: 41
End: 2021-10-07

## 2021-10-07 ENCOUNTER — HOSPITAL ENCOUNTER (OUTPATIENT)
Dept: GENERAL RADIOLOGY | Facility: HOSPITAL | Age: 41
Discharge: HOME | End: 2021-10-07

## 2021-10-07 DIAGNOSIS — L97.512 NON-PRESSURE CHRONIC ULCER OF OTHER PART OF RIGHT FOOT WITH FAT LAYER EXPOSED (HCC): ICD-10-CM

## 2021-10-07 DIAGNOSIS — L03.115 CELLULITIS OF RIGHT LOWER LIMB: ICD-10-CM

## 2021-10-07 DIAGNOSIS — M79.671 PAIN IN RIGHT FOOT: Primary | ICD-10-CM

## 2021-10-07 DIAGNOSIS — M79.671 PAIN IN RIGHT FOOT: ICD-10-CM

## 2021-10-07 PROCEDURE — 87070 CULTURE OTHR SPECIMN AEROBIC: CPT | Performed by: STUDENT IN AN ORGANIZED HEALTH CARE EDUCATION/TRAINING PROGRAM

## 2021-10-07 PROCEDURE — 73630 X-RAY EXAM OF FOOT: CPT

## 2021-10-07 PROCEDURE — 87205 SMEAR GRAM STAIN: CPT | Performed by: STUDENT IN AN ORGANIZED HEALTH CARE EDUCATION/TRAINING PROGRAM

## 2021-10-10 LAB
BACTERIA SPEC AEROBE CULT: NORMAL
GRAM STN SPEC: NORMAL
GRAM STN SPEC: NORMAL

## 2021-10-15 ENCOUNTER — APPOINTMENT (OUTPATIENT)
Dept: WOUND CARE | Facility: HOSPITAL | Age: 41
End: 2021-10-15

## 2021-10-21 ENCOUNTER — APPOINTMENT (OUTPATIENT)
Dept: WOUND CARE | Facility: HOSPITAL | Age: 41
End: 2021-10-21

## 2021-10-29 ENCOUNTER — APPOINTMENT (OUTPATIENT)
Dept: WOUND CARE | Facility: HOSPITAL | Age: 41
End: 2021-10-29

## 2021-10-29 PROCEDURE — G0463 HOSPITAL OUTPT CLINIC VISIT: HCPCS

## 2021-11-12 ENCOUNTER — APPOINTMENT (OUTPATIENT)
Dept: WOUND CARE | Facility: HOSPITAL | Age: 41
End: 2021-11-12

## 2021-11-19 ENCOUNTER — APPOINTMENT (OUTPATIENT)
Dept: WOUND CARE | Facility: HOSPITAL | Age: 41
End: 2021-11-19

## 2021-11-19 PROCEDURE — G0463 HOSPITAL OUTPT CLINIC VISIT: HCPCS

## 2025-06-06 ENCOUNTER — INPATIENT HOSPITAL (OUTPATIENT)
Dept: URBAN - METROPOLITAN AREA HOSPITAL 107 | Facility: HOSPITAL | Age: 45
End: 2025-06-06
Payer: MEDICAID

## 2025-06-06 DIAGNOSIS — R13.10 DYSPHAGIA, UNSPECIFIED: ICD-10-CM

## 2025-06-06 PROCEDURE — 99232 SBSQ HOSP IP/OBS MODERATE 35: CPT | Performed by: PHYSICIAN ASSISTANT

## 2025-06-09 ENCOUNTER — INPATIENT HOSPITAL (OUTPATIENT)
Dept: URBAN - METROPOLITAN AREA HOSPITAL 107 | Facility: HOSPITAL | Age: 45
End: 2025-06-09
Payer: MEDICAID

## 2025-06-09 DIAGNOSIS — K59.00 CONSTIPATION, UNSPECIFIED: ICD-10-CM

## 2025-06-09 PROCEDURE — 99232 SBSQ HOSP IP/OBS MODERATE 35: CPT | Performed by: PHYSICIAN ASSISTANT

## (undated) DEVICE — PATIENT RETURN ELECTRODE, SINGLE-USE, CONTACT QUALITY MONITORING, ADULT, WITH 9FT CORD, FOR PATIENTS WEIGING OVER 33LBS. (15KG): Brand: MEGADYNE

## (undated) DEVICE — CULT AER/ANAEROB FASTIDIOUS BACT

## (undated) DEVICE — BNDG ELAS ELITE V/CLOSE 4IN 5YD LF

## (undated) DEVICE — STRIP PACKING W IODOFORM 1/4

## (undated) DEVICE — INTENDED FOR TISSUE SEPARATION, AND OTHER PROCEDURES THAT REQUIRE A SHARP SURGICAL BLADE TO PUNCTURE OR CUT.: Brand: BARD-PARKER ® STAINLESS STEEL BLADES

## (undated) DEVICE — BONE MARROW BIOPSY / ASPIRATION NEEDLE,J-TYPE: Brand: MONOJECT

## (undated) DEVICE — TRANSPOSAL ULTRAFLEX DUO/QUAD ULTRA CART MANIFOLD

## (undated) DEVICE — TRY SKINPREP DRYPREP

## (undated) DEVICE — SOL ANTISEP SCRB PVPI 7.5PCT 4OZ

## (undated) DEVICE — TRAP FLD MINIVAC MEGADYNE 100ML

## (undated) DEVICE — 1010 S-DRAPE TOWEL DRAPE 10/BX: Brand: STERI-DRAPE™

## (undated) DEVICE — DRSNG WND GZ CURAD OIL EMULSION 3X3IN STRL

## (undated) DEVICE — TOWEL,OR,DSP,ST,BLUE,STD,4/PK,20PK/CS: Brand: MEDLINE

## (undated) DEVICE — PK BASIC ORTHO 90

## (undated) DEVICE — DRSNG GZ PETROLTM XEROFORM CURAD 1X8IN STRL

## (undated) DEVICE — GLV SURG SENSICARE W/ALOE PF LF 7 STRL

## (undated) DEVICE — SUT PROLN 3/0 PS2 18IN 8687H

## (undated) DEVICE — SUCTION CANISTER, 1000CC,SAFELINER: Brand: DEROYAL

## (undated) DEVICE — PREP SOL POVIDONE/IODINE BT 4OZ

## (undated) DEVICE — PENCL E/S ULTRAVAC TELESCP NOSE HOLSTR 10FT

## (undated) DEVICE — 3M™ STERI-STRIP™ REINFORCED ADHESIVE SKIN CLOSURES, R1547, 1/2 IN X 4 IN (12 MM X 100 MM), 6 STRIPS/ENVELOPE: Brand: 3M™ STERI-STRIP™